# Patient Record
Sex: FEMALE | Race: WHITE | NOT HISPANIC OR LATINO | URBAN - METROPOLITAN AREA
[De-identification: names, ages, dates, MRNs, and addresses within clinical notes are randomized per-mention and may not be internally consistent; named-entity substitution may affect disease eponyms.]

---

## 2018-05-14 ENCOUNTER — INPATIENT (INPATIENT)
Facility: HOSPITAL | Age: 78
LOS: 2 days | Discharge: NURSING, REHAB OR LONG-TERM CARE | DRG: 149 | End: 2018-05-17
Attending: INTERNAL MEDICINE | Admitting: INTERNAL MEDICINE
Payer: MEDICARE

## 2018-05-14 VITALS
OXYGEN SATURATION: 97 % | RESPIRATION RATE: 16 BRPM | HEART RATE: 70 BPM | DIASTOLIC BLOOD PRESSURE: 112 MMHG | TEMPERATURE: 99 F | SYSTOLIC BLOOD PRESSURE: 165 MMHG

## 2018-05-14 DIAGNOSIS — I63.9 CEREBRAL INFARCTION, UNSPECIFIED: ICD-10-CM

## 2018-05-14 DIAGNOSIS — E78.5 HYPERLIPIDEMIA, UNSPECIFIED: ICD-10-CM

## 2018-05-14 DIAGNOSIS — R63.8 OTHER SYMPTOMS AND SIGNS CONCERNING FOOD AND FLUID INTAKE: ICD-10-CM

## 2018-05-14 DIAGNOSIS — I10 ESSENTIAL (PRIMARY) HYPERTENSION: ICD-10-CM

## 2018-05-14 DIAGNOSIS — Z29.9 ENCOUNTER FOR PROPHYLACTIC MEASURES, UNSPECIFIED: ICD-10-CM

## 2018-05-14 DIAGNOSIS — I48.91 UNSPECIFIED ATRIAL FIBRILLATION: ICD-10-CM

## 2018-05-14 LAB
ALBUMIN SERPL ELPH-MCNC: 4.1 G/DL — SIGNIFICANT CHANGE UP (ref 3.3–5)
ALP SERPL-CCNC: 38 U/L — LOW (ref 40–120)
ALT FLD-CCNC: 21 U/L — SIGNIFICANT CHANGE UP (ref 10–45)
ANION GAP SERPL CALC-SCNC: 11 MMOL/L — SIGNIFICANT CHANGE UP (ref 5–17)
APTT BLD: 34.1 SEC — SIGNIFICANT CHANGE UP (ref 27.5–37.4)
AST SERPL-CCNC: 42 U/L — HIGH (ref 10–40)
BASOPHILS NFR BLD AUTO: 0.5 % — SIGNIFICANT CHANGE UP (ref 0–2)
BILIRUB SERPL-MCNC: 0.7 MG/DL — SIGNIFICANT CHANGE UP (ref 0.2–1.2)
BUN SERPL-MCNC: 16 MG/DL — SIGNIFICANT CHANGE UP (ref 7–23)
CALCIUM SERPL-MCNC: 9.4 MG/DL — SIGNIFICANT CHANGE UP (ref 8.4–10.5)
CHLORIDE SERPL-SCNC: 99 MMOL/L — SIGNIFICANT CHANGE UP (ref 96–108)
CO2 SERPL-SCNC: 25 MMOL/L — SIGNIFICANT CHANGE UP (ref 22–31)
CREAT SERPL-MCNC: 0.9 MG/DL — SIGNIFICANT CHANGE UP (ref 0.5–1.3)
EOSINOPHIL NFR BLD AUTO: 1.8 % — SIGNIFICANT CHANGE UP (ref 0–6)
GLUCOSE SERPL-MCNC: 93 MG/DL — SIGNIFICANT CHANGE UP (ref 70–99)
HCT VFR BLD CALC: 41.1 % — SIGNIFICANT CHANGE UP (ref 34.5–45)
HGB BLD-MCNC: 14 G/DL — SIGNIFICANT CHANGE UP (ref 11.5–15.5)
INR BLD: 1.26 — HIGH (ref 0.88–1.16)
LYMPHOCYTES # BLD AUTO: 34.7 % — SIGNIFICANT CHANGE UP (ref 13–44)
MCHC RBC-ENTMCNC: 30.4 PG — SIGNIFICANT CHANGE UP (ref 27–34)
MCHC RBC-ENTMCNC: 34.1 G/DL — SIGNIFICANT CHANGE UP (ref 32–36)
MCV RBC AUTO: 89.2 FL — SIGNIFICANT CHANGE UP (ref 80–100)
MONOCYTES NFR BLD AUTO: 10.9 % — SIGNIFICANT CHANGE UP (ref 2–14)
NEUTROPHILS NFR BLD AUTO: 52.1 % — SIGNIFICANT CHANGE UP (ref 43–77)
PLATELET # BLD AUTO: 195 K/UL — SIGNIFICANT CHANGE UP (ref 150–400)
POTASSIUM SERPL-MCNC: SIGNIFICANT CHANGE UP MMOL/L (ref 3.5–5.3)
POTASSIUM SERPL-SCNC: SIGNIFICANT CHANGE UP MMOL/L (ref 3.5–5.3)
PROT SERPL-MCNC: 7.3 G/DL — SIGNIFICANT CHANGE UP (ref 6–8.3)
PROTHROM AB SERPL-ACNC: 14 SEC — HIGH (ref 9.8–12.7)
RBC # BLD: 4.61 M/UL — SIGNIFICANT CHANGE UP (ref 3.8–5.2)
RBC # FLD: 14.4 % — SIGNIFICANT CHANGE UP (ref 10.3–16.9)
SODIUM SERPL-SCNC: 135 MMOL/L — SIGNIFICANT CHANGE UP (ref 135–145)
TROPONIN T SERPL-MCNC: <0.01 NG/ML — SIGNIFICANT CHANGE UP (ref 0–0.01)
WBC # BLD: 5.7 K/UL — SIGNIFICANT CHANGE UP (ref 3.8–10.5)
WBC # FLD AUTO: 5.7 K/UL — SIGNIFICANT CHANGE UP (ref 3.8–10.5)

## 2018-05-14 PROCEDURE — 93010 ELECTROCARDIOGRAM REPORT: CPT

## 2018-05-14 PROCEDURE — 70450 CT HEAD/BRAIN W/O DYE: CPT | Mod: 26,59

## 2018-05-14 PROCEDURE — 71045 X-RAY EXAM CHEST 1 VIEW: CPT | Mod: 26

## 2018-05-14 PROCEDURE — 99285 EMERGENCY DEPT VISIT HI MDM: CPT | Mod: 25

## 2018-05-14 PROCEDURE — 70496 CT ANGIOGRAPHY HEAD: CPT | Mod: 26

## 2018-05-14 PROCEDURE — 70498 CT ANGIOGRAPHY NECK: CPT | Mod: 26

## 2018-05-14 RX ORDER — DIGOXIN 250 MCG
1 TABLET ORAL
Qty: 0 | Refills: 0 | COMMUNITY

## 2018-05-14 RX ORDER — ATORVASTATIN CALCIUM 80 MG/1
1 TABLET, FILM COATED ORAL
Qty: 0 | Refills: 0 | COMMUNITY

## 2018-05-14 RX ORDER — ASPIRIN/CALCIUM CARB/MAGNESIUM 324 MG
81 TABLET ORAL DAILY
Qty: 0 | Refills: 0 | Status: DISCONTINUED | OUTPATIENT
Start: 2018-05-14 | End: 2018-05-17

## 2018-05-14 RX ORDER — SODIUM CHLORIDE 9 MG/ML
1000 INJECTION INTRAMUSCULAR; INTRAVENOUS; SUBCUTANEOUS
Qty: 0 | Refills: 0 | Status: DISCONTINUED | OUTPATIENT
Start: 2018-05-14 | End: 2018-05-15

## 2018-05-14 RX ORDER — ATORVASTATIN CALCIUM 80 MG/1
80 TABLET, FILM COATED ORAL AT BEDTIME
Qty: 0 | Refills: 0 | Status: DISCONTINUED | OUTPATIENT
Start: 2018-05-14 | End: 2018-05-17

## 2018-05-14 RX ORDER — APIXABAN 2.5 MG/1
1 TABLET, FILM COATED ORAL
Qty: 0 | Refills: 0 | COMMUNITY

## 2018-05-14 RX ORDER — GABAPENTIN 400 MG/1
1 CAPSULE ORAL
Qty: 0 | Refills: 0 | COMMUNITY

## 2018-05-14 RX ORDER — APIXABAN 2.5 MG/1
5 TABLET, FILM COATED ORAL EVERY 12 HOURS
Qty: 0 | Refills: 0 | Status: DISCONTINUED | OUTPATIENT
Start: 2018-05-14 | End: 2018-05-17

## 2018-05-14 RX ORDER — ASPIRIN/CALCIUM CARB/MAGNESIUM 324 MG
81 TABLET ORAL DAILY
Qty: 0 | Refills: 0 | Status: DISCONTINUED | OUTPATIENT
Start: 2018-05-14 | End: 2018-05-14

## 2018-05-14 RX ORDER — TRAMADOL HYDROCHLORIDE 50 MG/1
0 TABLET ORAL
Qty: 0 | Refills: 0 | COMMUNITY

## 2018-05-14 RX ORDER — LABETALOL HCL 100 MG
10 TABLET ORAL ONCE
Qty: 0 | Refills: 0 | Status: DISCONTINUED | OUTPATIENT
Start: 2018-05-14 | End: 2018-05-14

## 2018-05-14 RX ORDER — DIGOXIN 250 MCG
0.12 TABLET ORAL DAILY
Qty: 0 | Refills: 0 | Status: DISCONTINUED | OUTPATIENT
Start: 2018-05-14 | End: 2018-05-17

## 2018-05-14 RX ORDER — METOPROLOL TARTRATE 50 MG
1 TABLET ORAL
Qty: 0 | Refills: 0 | COMMUNITY

## 2018-05-14 RX ADMIN — SODIUM CHLORIDE 100 MILLILITER(S): 9 INJECTION INTRAMUSCULAR; INTRAVENOUS; SUBCUTANEOUS at 23:07

## 2018-05-14 RX ADMIN — ATORVASTATIN CALCIUM 80 MILLIGRAM(S): 80 TABLET, FILM COATED ORAL at 23:07

## 2018-05-14 NOTE — ED PROVIDER NOTE - PROGRESS NOTE DETAILS
stroke code called upon patient's arrival, pt sent in for r/o cerebellar stroke, spoke to dr. 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 Discussed with neuro attending. Will hold off on labetalol, allow generous bp to 220/120 without treatment. Will admit.

## 2018-05-14 NOTE — ED ADULT NURSE NOTE - OBJECTIVE STATEMENT
Pt sent in by PCP for stroke eval. Pt reports "I was swaying from side to side when I was walking and it felt like pressure on my shoulders pushing down. All day i've felt weak." Pt reports occipital HA, currently HTN. will monitor and maintain safety.

## 2018-05-14 NOTE — ED ADULT TRIAGE NOTE - CHIEF COMPLAINT QUOTE
dizziness started this morning - went to MD -- sent here for R/O CVA;; per notes client has HA and unsteady gait

## 2018-05-14 NOTE — ED PROVIDER NOTE - MEDICAL DECISION MAKING DETAILS
77F with concern for dizziness, hx of stroke, sent in by PMD to r/o cerebellar stroke, sx onset in AM, discussed with neuro team after code stroke called. Will hold off on tpa at this time given pt is out of window (sx started in morning), will obtain CT/CTA of head/neck.

## 2018-05-14 NOTE — ED ADULT TRIAGE NOTE - LANGUAGE ASSISTANCE NEEDED
Yes-Patient/Caregiver accepts free interpretation services.../upgraded client because of MD notes - proceding with assessment and getting

## 2018-05-14 NOTE — H&P ADULT - HISTORY OF PRESENT ILLNESS
77 yr old Rwandan speaking female with a PMHx of CVA (unknown deficits), A-fib (on Dig and eliquis) and HTN/HLD presenting from PMD's office with acute onset dizziness and difficulty with ambulating since waking up this morning. Patient's last known normal was yesterday before going to sleep and woke up this morning with presenting symptoms. Patient also endorses crown of head pressure and heaviness but denies any headache, blurry vision, nausea, vomiting, neck pain, chest pain.  NIHSS 1     ED VS:   Vital Signs Last 24 Hrs  T(C): 37 (14 May 2018 18:11), Max: 37 (14 May 2018 18:11)  T(F): 98.6 (14 May 2018 18:11), Max: 98.6 (14 May 2018 18:11)  HR: 87 (14 May 2018 18:48) (63 - 87)  BP: 217/128 (14 May 2018 18:48) (165/112 - 217/128)  BP(mean): --  RR: 18 (14 May 2018 18:48) (16 - 18)  SpO2: 99% (14 May 2018 18:48) (97% - 99%)

## 2018-05-14 NOTE — ED ADULT NURSE NOTE - PMH
Afib    CVA (cerebral vascular accident)    HTN (hypertension) Afib    CVA (cerebral vascular accident)    HLD (hyperlipidemia)    HTN (hypertension)

## 2018-05-14 NOTE — H&P ADULT - NSHPLABSRESULTS_GEN_ALL_CORE
LABS:                          14.0   5.7   )-----------( 195      ( 14 May 2018 18:41 )             41.1     05-14    135  |  99  |  16  ----------------------------<  93  see note   |  25  |  0.90    Ca    9.4      14 May 2018 18:41    TPro  7.3  /  Alb  4.1  /  TBili  0.7  /  DBili  x   /  AST  42<H>  /  ALT  21  /  AlkPhos  38<L>  05-14    PT/INR - ( 14 May 2018 18:41 )   PT: 14.0 sec;   INR: 1.26          PTT - ( 14 May 2018 18:41 )  PTT:34.1 sec      RADIOLOGY & ADDITIONAL STUDIES:      CT Brain Stroke Protocol   IMPRESSION: No acute transcortical infarct or intracranial hemorrhage.   Old infarction in the left frontal lobe. Chronic small vessel ischemic   changes, severe in extent.

## 2018-05-14 NOTE — ED PROVIDER NOTE - OBJECTIVE STATEMENT
77F with hx of HTN, previous stroke, on eliquis, Dig (afib hx) presenting with dizziness unsteady gait from PMD's office, was sent in concern for stroke, pt upgraded to me due to concern for stroke. Sx began in morning. Unclear last seen normal, Albanian speaking female.

## 2018-05-14 NOTE — H&P ADULT - ASSESSMENT
77 yr old female with a PMHx of CVA (unclear deficits), a-fib (on eliquis, dig, toprol), HTN and HLD presenting with new onset RUE weakness, dizziness, difficulty ambulating since waking up this morning admitted to  for stroke r/o

## 2018-05-14 NOTE — H&P ADULT - PROBLEM SELECTOR PLAN 1
-patient new onset RUE weakness and dizziness; last known normal was last night before bed  -CTH (-) for acute stroke but s/f chronic L frontal lobe wedge shape infarct  -CTA pending  -MR Head   -MIRNA in AM  -PT/OT consult  -dysphagia screen  -ASA 81  -TSH, A1c, FLP to optimize stroke risk factors

## 2018-05-14 NOTE — ED PROVIDER NOTE - PHYSICAL EXAMINATION
General: NAD, alert, conversant, comfortable-appearing  Head: ncat  Eyes: clear, pupils round reactive to light no nystagmus  Thoat: MMM, oropharynx clear  Neck: supple,   CV: RRR no murmurs  Resp: CTAB no w/c/r  Abd: nontender, no rebound/guarding  Ext: no lower ext swelling, 2+ dp/pt pulses  Neuro: alert, cn grossly intact, no nystagmus, pupils equal round reactive, strength equal in all 4 ext, fnf mild dysmetria bl

## 2018-05-15 LAB
ANION GAP SERPL CALC-SCNC: 10 MMOL/L — SIGNIFICANT CHANGE UP (ref 5–17)
BLD GP AB SCN SERPL QL: NEGATIVE — SIGNIFICANT CHANGE UP
BUN SERPL-MCNC: 13 MG/DL — SIGNIFICANT CHANGE UP (ref 7–23)
CALCIUM SERPL-MCNC: 9.4 MG/DL — SIGNIFICANT CHANGE UP (ref 8.4–10.5)
CHLORIDE SERPL-SCNC: 103 MMOL/L — SIGNIFICANT CHANGE UP (ref 96–108)
CO2 SERPL-SCNC: 27 MMOL/L — SIGNIFICANT CHANGE UP (ref 22–31)
CREAT SERPL-MCNC: 0.96 MG/DL — SIGNIFICANT CHANGE UP (ref 0.5–1.3)
GLUCOSE SERPL-MCNC: 90 MG/DL — SIGNIFICANT CHANGE UP (ref 70–99)
HCT VFR BLD CALC: 40 % — SIGNIFICANT CHANGE UP (ref 34.5–45)
HGB BLD-MCNC: 13.6 G/DL — SIGNIFICANT CHANGE UP (ref 11.5–15.5)
MAGNESIUM SERPL-MCNC: 2 MG/DL — SIGNIFICANT CHANGE UP (ref 1.6–2.6)
MCHC RBC-ENTMCNC: 30.3 PG — SIGNIFICANT CHANGE UP (ref 27–34)
MCHC RBC-ENTMCNC: 34 G/DL — SIGNIFICANT CHANGE UP (ref 32–36)
MCV RBC AUTO: 89.1 FL — SIGNIFICANT CHANGE UP (ref 80–100)
PLATELET # BLD AUTO: 191 K/UL — SIGNIFICANT CHANGE UP (ref 150–400)
POTASSIUM SERPL-MCNC: 4.1 MMOL/L — SIGNIFICANT CHANGE UP (ref 3.5–5.3)
POTASSIUM SERPL-SCNC: 4.1 MMOL/L — SIGNIFICANT CHANGE UP (ref 3.5–5.3)
RBC # BLD: 4.49 M/UL — SIGNIFICANT CHANGE UP (ref 3.8–5.2)
RBC # FLD: 14.3 % — SIGNIFICANT CHANGE UP (ref 10.3–16.9)
RH IG SCN BLD-IMP: POSITIVE — SIGNIFICANT CHANGE UP
SODIUM SERPL-SCNC: 140 MMOL/L — SIGNIFICANT CHANGE UP (ref 135–145)
T4 FREE SERPL-MCNC: 1 NG/DL — SIGNIFICANT CHANGE UP (ref 0.7–1.48)
WBC # BLD: 6.1 K/UL — SIGNIFICANT CHANGE UP (ref 3.8–10.5)
WBC # FLD AUTO: 6.1 K/UL — SIGNIFICANT CHANGE UP (ref 3.8–10.5)

## 2018-05-15 PROCEDURE — 70551 MRI BRAIN STEM W/O DYE: CPT | Mod: 26

## 2018-05-15 PROCEDURE — 93306 TTE W/DOPPLER COMPLETE: CPT | Mod: 26

## 2018-05-15 RX ADMIN — Medication 81 MILLIGRAM(S): at 17:37

## 2018-05-15 RX ADMIN — APIXABAN 5 MILLIGRAM(S): 2.5 TABLET, FILM COATED ORAL at 06:04

## 2018-05-15 RX ADMIN — APIXABAN 5 MILLIGRAM(S): 2.5 TABLET, FILM COATED ORAL at 17:36

## 2018-05-15 RX ADMIN — Medication 0.12 MILLIGRAM(S): at 06:04

## 2018-05-15 RX ADMIN — ATORVASTATIN CALCIUM 80 MILLIGRAM(S): 80 TABLET, FILM COATED ORAL at 21:08

## 2018-05-15 NOTE — OCCUPATIONAL THERAPY INITIAL EVALUATION ADULT - MD ORDER
77 yr old Mauritian speaking female with a PMHx of CVA (unknown deficits), A-fib (on Dig and eliquis) and HTN/HLD presenting from PMD's office with acute onset dizziness and difficulty with ambulating since waking up this morning. Patient's last known normal was yesterday before going to sleep and woke up this morning with presenting symptoms. Patient also endorses crown of head pressure and heaviness but denies any headache, blurry vision, nausea, vomiting, neck pain, chest pain. NIHSS 1

## 2018-05-15 NOTE — OCCUPATIONAL THERAPY INITIAL EVALUATION ADULT - ADDITIONAL COMMENTS
Reports independence with functional mobility and basic and instrumental Activities of Daily Living PTA without AD. Patient reports right upper extremity weakness from old CVA.     Patient Singaporean Speaking, evaluation with PT Carola present to assist with translation.

## 2018-05-15 NOTE — PROGRESS NOTE ADULT - PROBLEM SELECTOR PLAN 6
SCDs    F: NS at 100cc/hr  E: Replete PRN  N: DASH  Lines: peripheral  Code; full  Dispo: 7lachman while undergoing stroke workup

## 2018-05-15 NOTE — OCCUPATIONAL THERAPY INITIAL EVALUATION ADULT - STANDING BALANCE: DYNAMIC, REHAB EVAL
ambulated 4 bedside steps with [place]/CGA and right Hand Held Assist; limited assessment in context of hypertension - RN Rachel aware/poor plus

## 2018-05-15 NOTE — PHYSICAL THERAPY INITIAL EVALUATION ADULT - PREDICTED DURATION OF THERAPY (DAYS/WKS), PT EVAL
Pt. would benefit from further PT follow up to further assess and improve gait/balance - to facilitate return to prior level of function.

## 2018-05-15 NOTE — OCCUPATIONAL THERAPY INITIAL EVALUATION ADULT - VISUAL ASSESSMENT: EYE MUSCLE BALANCE
multidirectional nystagmus: + left horizontal beating nystagmus when tracking to left and + right horizontal beating nystagmus when tracking to right

## 2018-05-15 NOTE — OCCUPATIONAL THERAPY INITIAL EVALUATION ADULT - LEVEL OF INDEPENDENCE: SIT/STAND, REHAB EVAL
right Hand Held Assist; X1rep, limited in context of hypertension, RN Rachel aware/stand-by assist/contact guard

## 2018-05-15 NOTE — CONSULT NOTE ADULT - ASSESSMENT
77 yr old female with a PMHx of CVA (unclear deficits), a-fib (on eliquis, dig, toprol), HTN and HLD presenting with new onset RUE weakness, dizziness, difficulty ambulating since waking up this morning admitted to  for stroke r/o      Problem/Plan - 1:  ·  Problem: Cerebrovascular accident (CVA), unspecified mechanism.  Plan: -patient new onset RUE weakness and dizziness; last known normal was last night before bed  -CTH (-) for acute stroke but s/f chronic L frontal lobe wedge shape infarct  -CTA pending  -MR Head   -MIRNA

## 2018-05-15 NOTE — CONSULT NOTE ADULT - SUBJECTIVE AND OBJECTIVE BOX
Patient is a 77y old  Female who presents with a chief complaint of r/o stroke (14 May 2018 19:24)       HPI:  77 yr old Norwegian speaking female with a PMHx of CVA (unknown deficits), A-fib (on Dig and eliquis) and HTN/HLD presenting from PMD's office with acute onset dizziness and difficulty with ambulating since waking up this morning. Patient's last known normal was yesterday before going to sleep and woke up this morning with presenting symptoms. Patient also endorses crown of head pressure and heaviness but denies any headache, blurry vision, nausea, vomiting, neck pain, chest pain.  NIHSS 1     ED VS:   Vital Signs Last 24 Hrs  T(C): 37 (14 May 2018 18:11), Max: 37 (14 May 2018 18:11)  T(F): 98.6 (14 May 2018 18:11), Max: 98.6 (14 May 2018 18:11)  HR: 87 (14 May 2018 18:48) (63 - 87)  BP: 217/128 (14 May 2018 18:48) (165/112 - 217/128)  BP(mean): --  RR: 18 (14 May 2018 18:48) (16 - 18)  SpO2: 99% (14 May 2018 18:48) (97% - 99%) (14 May 2018 19:24)      PAST MEDICAL & SURGICAL HISTORY:  HLD (hyperlipidemia)  Afib  CVA (cerebral vascular accident)  HTN (hypertension)      MEDICATIONS  (STANDING):  apixaban 5 milliGRAM(s) Oral every 12 hours  aspirin  chewable 81 milliGRAM(s) Oral daily  atorvastatin 80 milliGRAM(s) Oral at bedtime  digoxin     Tablet 0.125 milliGRAM(s) Oral daily  sodium chloride 0.9%. 1000 milliLiter(s) (100 mL/Hr) IV Continuous <Continuous>    MEDICATIONS  (PRN):      Social History: lives with her friend in an elevator accessible apartment building, no home care services    Functional Level Prior to Admission: ADL independent, walks without assistive devices    FAMILY HISTORY:      CBC Full  -  ( 15 May 2018 02:51 )  WBC Count : 6.1 K/uL  Hemoglobin : 13.6 g/dL  Hematocrit : 40.0 %  Platelet Count - Automated : 191 K/uL  Mean Cell Volume : 89.1 fL  Mean Cell Hemoglobin : 30.3 pg  Mean Cell Hemoglobin Concentration : 34.0 g/dL  Auto Neutrophil # : x  Auto Lymphocyte # : x  Auto Monocyte # : x  Auto Eosinophil # : x  Auto Basophil # : x  Auto Neutrophil % : x  Auto Lymphocyte % : x  Auto Monocyte % : x  Auto Eosinophil % : x  Auto Basophil % : x      05-15    140  |  103  |  13  ----------------------------<  90  4.1   |  27  |  0.96    Ca    9.4      15 May 2018 02:51  Mg     2.0     05-15    TPro  7.3  /  Alb  4.1  /  TBili  0.7  /  DBili  x   /  AST  42<H>  /  ALT  21  /  AlkPhos  38<L>  05-14            Radiology:    < from: CT Brain Stroke Protocol (05.14.18 @ 18:34) >  EXAM:  CT BRAIN STROKE PROTOCOL                          PROCEDURE DATE:  05/14/2018                     INTERPRETATION:  INorbert MD, have reviewed the images and the   report and agree with the findings, with the following modification:    There also are encephalomalacia changes within the paramedian right   occipital lobe compatible with old infarct along the right PCA territory.    RESIDENT PRELIMINARY REPORT:     PROCEDURE: CT head without intravenous contrast    INDICATION: Dizziness.    TECHNIQUE: Multiple axial images were obtained at 5 mm intervals from the   skull base to the vertex. The images were reviewed in brain and bone   windows.    COMPARISON: None.    FINDINGS: The CT examination demonstrates a wedge-shaped hypodensity in   the left frontal lobe with associated volume loss and encephalomalacia,   consistent with an old infarction. There is no acute transcortical   infarct. There is no intracranial hemorrhage. There is age-appropriate   volume loss as manifested by the enlargement of the ventricles, cisternal   spaces, and cortical sulci throughout. There is no midline shift or extra   axial collections. There are confluent areas of hypodensity within the   periventricular white matter which likely represents the sequela of small   vessel ischemic disease, severe in extent. The bony windows demonstrates   no fractures. The visualized paranasal sinuses are within normal limits.   The mastoid air cells are well aerated.    IMPRESSION: No acute transcortical infarct or intracranial hemorrhage.   Old infarction in the left frontal lobe. Chronic small vessel ischemic   changes, severe in extent.         < from: CT Angio Head w/ IV Cont (05.14.18 @ 19:04) >  EXAM:  CT ANGIO BRAIN (W)AW IC                          EXAM:  CT ANGIO NECK (W)AW IC                          PROCEDURE DATE:  05/14/2018     90  Optiray 350   0           INTERPRETATION:  PROCEDURE: CTA brain with and without intravenous   contrast.    INDICATION: Dizziness    TECHNIQUE: Multiple thin section axial images were obtained through the   [place] following the intravenous injection of contrast. MPR   sagittal and coronal MIP images were generated from the axial images.    COMPARISON: None    FINDINGS: The CTA examination of the [place] demonstrates the   internal carotid arteries to be normal in caliber. There is a normal   bifurcation into the A1 and M1 segments. The right A1 segment is   underdeveloped. The anterior communicating artery is patent. There is a   normal MCA bifurcation. There is tortuosity of the vertebrobasilar   system. The right vertebral artery is underdeveloped and ends in the   posterior inferior cerebellar artery (PICA). The left vertebral artery is   normal in caliber. The basilar artery is normal in caliber. There is a   normal bifurcation into the posterior cerebral arteries. There are no   areas of stenosis, dilatation or aneurysm.    IMPRESSION: No large vessel occlusion.      PROCEDURE: CTA neck with and without intravenous contrast.    INDICATION: Dizziness    TECHNIQUE: Multiple axial thin section were obtained through the neck   following the intravenous injection of contrast. MPR sagittal and coronal   MIP images were generated from the axial images.     COMPARISON: None    FINDINGS: The CTA examination demonstrates a retropharyngeal course to   the right common carotid artery. The right common carotid artery to be   normal in caliber. There is a normal bifurcation into the right internal   and external carotid arteries. There is no hemodynamically significant   stenosis.     There also is a retropharyngeal course to the left common carotid artery.   The left common carotid artery is otherwise normal in caliber. There is a   normal bifurcation into the left internal and external carotid arteries.   There is calcification at the left carotid bifurcation and proximal left   internal carotid artery. There is no hemodynamically significant   stenosis.     The right vertebral artery is underdeveloped. There is an ectatic course   to the proximal vertebral arteries bilaterally. The vertebral arteries   are otherwise normal in caliber.    The aortic arch appears intact without narrowing of the origin of the  great vessels.    Limited evaluation of the neck demonstrates a a paucity of right-sided   thyroid glandular tissue with the left lobe being dominant.    IMPRESSION: No carotid stenosis.            Vital Signs Last 24 Hrs  T(C): 36.9 (15 May 2018 13:53), Max: 37.1 (15 May 2018 06:00)  T(F): 98.4 (15 May 2018 13:53), Max: 98.7 (15 May 2018 06:00)  HR: 100 (15 May 2018 12:54) (63 - 100)  BP: 139/91 (15 May 2018 12:54) (139/91 - 217/128)  BP(mean): 110 (15 May 2018 12:54) (110 - 135)  RR: 18 (15 May 2018 08:17) (16 - 18)  SpO2: 95% (15 May 2018 12:54) (95% - 99%)    REVIEW OF SYSTEMS:    CONSTITUTIONAL: No fever, weight loss, or fatigue  EYES: No eye pain, visual disturbances, or discharge  ENMT:  No difficulty hearing, tinnitus, vertigo; No sinus or throat pain  NECK: No pain or stiffness  BREASTS: No pain, masses, or nipple discharge  RESPIRATORY: No cough, wheezing, chills or hemoptysis; No shortness of breath  CARDIOVASCULAR: No chest pain, palpitations, dizziness, or leg swelling  GASTROINTESTINAL: No abdominal or epigastric pain. No nausea, vomiting, or hematemesis; No diarrhea or constipation. No melena or hematochezia.  GENITOURINARY: No dysuria, frequency, hematuria, or incontinence  NEUROLOGICAL: dizziness  SKIN: No itching, burning, rashes, or lesions   LYMPH NODES: No enlarged glands  ENDOCRINE: No heat or cold intolerance; No hair loss  MUSCULOSKELETAL: No joint pain or swelling; No muscle, back, or extremity pain  PSYCHIATRIC: No depression, anxiety, mood swings, or difficulty sleeping  HEME/LYMPH: No easy bruising, or bleeding gums  ALLERGY AND IMMUNOLOGIC: No hives or eczema  VASCULAR: no swelling, erythema    Physical Exam: WDWN  woman lying comfortably in semi Banks's position, c/o dizziness    Head: normocephalic, atraumatic    Eyes: PERRLA, EOMI, no nystagmus, sclera anicteric    ENT: nasal discharge, uvula midline, no oropharyngeal erythema/exudate    Neck: supple, negative JVD, negative carotid bruits, no thyromegaly    Chest: CTA bilaterally, neg wheeze, rhonchi, rales, crackles, egophany    Cardiovascular: regular rate and rhythm, neg murmurs/rubs/gallops    Abdomen: soft, non distended, non tender, negative rebound/guarding, normal bowel sounds, neg hepatosplenomegaly    Extremities: WWP, neg cyanosis/clubbing/edema, negative calf tenderness to palpation, negative Pierce's sign    :     Neurologic Exam:    Alert and oriented to person, place, date/year, speech fluent w/o dysarthria, repetition intact, comprehension intact,     Cranial Nerves:     II:                       pupils equal, round and reactive to light, visual fields intact   III/ IV/VI:             extraocular movements intact, horizontal nystagmus with left and right gaze  V:                       facial sensation intact, V1-3 normal  VII:                     face symmetric, no droop, normal eye closure and smile  VIII:                    hearing intact to finger rub bilaterally  IX/ X:                 soft palate rise symmetrical  XI:                      head turning, shoulder shrug normal  XII:                     tongue midline    Motor Exam:    Upper Extremities:        Right:    4/5 /intrinsics                                                 4/5 deltoid                                                 4-/5 biceps                                                 5/5 triceps                                                 5/5 wrist extensors-flexors                                                 negative pronator drift                                        Left:      5/5 /intrinsics                                                 5/5 deltoid                                                 5/5 biceps                                                 5/5 triceps                                                 5/5 wrist extensors/flexors                                                 negative pronator drift      Lower Extremities:         Right      5/5 hip flexors/adductors/abductors                                                   5/5 quadriceps/hamstrings                                                   5/5 dorsiflexors/plantar flexors/invertors-evertors                                       Left:       5/5 hip flexors/adductors/abductors                                                  5/5 quadriceps/hamstrings                                                  5/5 dorsiflexors/plantar flexors/invertors-evertors    Sensory:              intact to LT/PP in all UE/LE dermatomes    DTR:                   = biceps/     triceps/     brachioradialis                            = patella/   medial hamstring/    ankle                            neg clonus                            neg Babinski                            neg Hoffmans    Finger to Nose:  wnl    Heel to Shin:       wnl    Rapid Alternating movements:   wnl    Joint Position Sense:  intact    Romberg:  not tested    Tandem Walking:  not tested    Gait:  not tested        PM&R Impression:    1) r/o CVA  2) Right UE weakness 4/5 range ( patient reports this is chronic from previous CVA)      Recommendations:    1) Physical therapy focusing on therapeutic exercises, bed mobility/transfer out of bed evaluation, progressive ambulation with assistive devices.    2) Anticipated Disposition Plan/Recommendations:  pending functional progress/ MRI brain results

## 2018-05-15 NOTE — PHYSICAL THERAPY INITIAL EVALUATION ADULT - MODALITIES TREATMENT COMMENTS
Facial symmetry intact, speech fluent, + nystagmus 3-4 beats in both directions; denies dizziness at this time.

## 2018-05-15 NOTE — OCCUPATIONAL THERAPY INITIAL EVALUATION ADULT - MANUAL MUSCLE TESTING RESULTS, REHAB EVAL
right upper extremity: shoulder flexion/extension 4+/5, elbow flexion 3+/5, elbow extension 4-/5, wrist extension 4-/5, hand  4+/5; left upper extremity 5/5 throughout

## 2018-05-15 NOTE — OCCUPATIONAL THERAPY INITIAL EVALUATION ADULT - COORDINATION ASSESSED, REHAB EVAL
finger to nose/left upper extremity grossly intact, right upper extremity decreased speed no dysmetria noted

## 2018-05-15 NOTE — OCCUPATIONAL THERAPY INITIAL EVALUATION ADULT - PLANNED THERAPY INTERVENTIONS, OT EVAL
bed mobility training/IADL retraining/balance training/ADL retraining/strengthening/transfer training

## 2018-05-15 NOTE — OCCUPATIONAL THERAPY INITIAL EVALUATION ADULT - PERTINENT HX OF CURRENT PROBLEM, REHAB EVAL
77 yr old Ukrainian speaking female with a PMHx of CVA (unknown deficits), A-fib (on Dig and eliquis) and HTN/HLD presenting from PMD's office with acute onset dizziness and difficulty with ambulating since waking up this morning. Patient's last known normal was yesterday before going to sleep and woke up this morning with presenting symptoms. Patient also endorses crown of head pressure and heaviness but denies any headache, blurry vision, nausea, vomiting, neck pain, chest pain. NIHSS 1.

## 2018-05-15 NOTE — PROGRESS NOTE ADULT - SUBJECTIVE AND OBJECTIVE BOX
OVERNIGHT EVENTS:   SUBJECTIVE / INTERVAL HPI: Patient seen and examined at bedside.     VITAL SIGNS:  Vital Signs Last 24 Hrs  T(C): 37.1 (15 May 2018 17:20), Max: 37.1 (15 May 2018 06:00)  T(F): 98.8 (15 May 2018 17:20), Max: 98.8 (15 May 2018 17:20)  HR: 106 (15 May 2018 17:40) (68 - 106)  BP: 118/65 (15 May 2018 17:40) (118/65 - 178/112)  BP(mean): 87 (15 May 2018 17:40) (87 - 135)  RR: 18 (15 May 2018 17:40) (17 - 18)  SpO2: 92% (15 May 2018 17:40) (92% - 98%)    PHYSICAL EXAM:  General: WDWN  HEENT: NC/AT; PERRL, anicteric sclera; MMM  Neck: supple  Cardiovascular: +S1/S2; RRR  Respiratory: CTA B/L; no W/R/R  Gastrointestinal: soft, NT/ND; +BSx4  Extremities: WWP; no edema, clubbing or cyanosis  Vascular: 2+ radial, DP/PT pulses B/L  Neurological: AAOx3; no focal deficits    MEDICATIONS:  MEDICATIONS  (STANDING):  apixaban 5 milliGRAM(s) Oral every 12 hours  aspirin  chewable 81 milliGRAM(s) Oral daily  atorvastatin 80 milliGRAM(s) Oral at bedtime  digoxin     Tablet 0.125 milliGRAM(s) Oral daily  sodium chloride 0.9%. 1000 milliLiter(s) (100 mL/Hr) IV Continuous <Continuous>    MEDICATIONS  (PRN):      ALLERGIES:  Allergies    No Known Allergies    Intolerances        LABS:                        13.6   6.1   )-----------( 191      ( 15 May 2018 02:51 )             40.0     05-15    140  |  103  |  13  ----------------------------<  90  4.1   |  27  |  0.96    Ca    9.4      15 May 2018 02:51  Mg     2.0     05-15    TPro  7.3  /  Alb  4.1  /  TBili  0.7  /  DBili  x   /  AST  42<H>  /  ALT  21  /  AlkPhos  38<L>  05-14    PT/INR - ( 14 May 2018 18:41 )   PT: 14.0 sec;   INR: 1.26          PTT - ( 14 May 2018 18:41 )  PTT:34.1 sec    CAPILLARY BLOOD GLUCOSE      POCT Blood Glucose.: 78 mg/dL (14 May 2018 18:21)    CARDIAC MARKERS ( 14 May 2018 18:41 )  x     / <0.01 ng/mL / x     / x     / x            RADIOLOGY & ADDITIONAL TESTS: Reviewed. OVERNIGHT EVENTS: No overnight events.   SUBJECTIVE / INTERVAL HPI: Patient seen and examined at bedside. She is Bangladeshi speaking. Her daughter is at bedside. She is denying dizziness or weakness, distressed because she is hungry (awaiting MIRNA).     VITAL SIGNS:  Vital Signs Last 24 Hrs  T(C): 37.1 (15 May 2018 17:20), Max: 37.1 (15 May 2018 06:00)  T(F): 98.8 (15 May 2018 17:20), Max: 98.8 (15 May 2018 17:20)  HR: 106 (15 May 2018 17:40) (68 - 106)  BP: 118/65 (15 May 2018 17:40) (118/65 - 178/112)  BP(mean): 87 (15 May 2018 17:40) (87 - 135)  RR: 18 (15 May 2018 17:40) (17 - 18)  SpO2: 92% (15 May 2018 17:40) (92% - 98%)    PHYSICAL EXAM:  General: WDWN, elderly frail female sitting up in bed in no acute distress breathing room air.   HEENT: NC/AT; PERRL, anicteric sclera; MMM  Neck: supple  Cardiovascular: +S1/S2; RRR  Respiratory: CTA B/L; no W/R/R  Gastrointestinal: soft, NT/ND; +BSx4  Extremities: WWP; no edema, clubbing or cyanosis  Vascular: 2+ radial, DP/PT pulses B/L  Neurological: AAOx3; no focal deficits. CN2-12 intact, Strength 5/5, sensation intact throughout. Cerebellar testing showed no defect.      MEDICATIONS:  MEDICATIONS  (STANDING):  apixaban 5 milliGRAM(s) Oral every 12 hours  aspirin  chewable 81 milliGRAM(s) Oral daily  atorvastatin 80 milliGRAM(s) Oral at bedtime  digoxin     Tablet 0.125 milliGRAM(s) Oral daily  sodium chloride 0.9%. 1000 milliLiter(s) (100 mL/Hr) IV Continuous <Continuous>    MEDICATIONS  (PRN):      ALLERGIES:  Allergies    No Known Allergies    Intolerances        LABS:                        13.6   6.1   )-----------( 191      ( 15 May 2018 02:51 )             40.0     05-15    140  |  103  |  13  ----------------------------<  90  4.1   |  27  |  0.96    Ca    9.4      15 May 2018 02:51  Mg     2.0     05-15    TPro  7.3  /  Alb  4.1  /  TBili  0.7  /  DBili  x   /  AST  42<H>  /  ALT  21  /  AlkPhos  38<L>  05-14    PT/INR - ( 14 May 2018 18:41 )   PT: 14.0 sec;   INR: 1.26          PTT - ( 14 May 2018 18:41 )  PTT:34.1 sec    CAPILLARY BLOOD GLUCOSE      POCT Blood Glucose.: 78 mg/dL (14 May 2018 18:21)    CARDIAC MARKERS ( 14 May 2018 18:41 )  x     / <0.01 ng/mL / x     / x     / x            RADIOLOGY & ADDITIONAL TESTS: Reviewed.    < from: Echocardiogram (05.15.18 @ 15:11) >  Interpretation Summary  Patient was in atrial fibrillation during the study. There is mild   concentric   left ventricular hypertrophy.The left ventricular wall motion is   normal.The   left ventricular ejection fraction is normal.The left ventricular  ejection   fraction is 65%.The right ventricle is normal in size and function.The   left   atrium is mildly dilated. The left atrial volume index is 39 cc/m2   (normal   <34cc/m2) The left atrial appendage was not visualized.   Injection of   agitated saline contrast documented no interatrial shunt.Right atrial   size is   normal.No evidence for any hemodynamically significant valvular   disease.The   pulmonary artery systolic pressure is estimated to be 30 mmHg.There is no   pericardial effusion.Borderline dilated ascending aorta.The ascending   aorta   measures 3.8 cm (normal less than 3.8 cm for men, less than 3.5 cm for   women).    < end of copied text >

## 2018-05-15 NOTE — OCCUPATIONAL THERAPY INITIAL EVALUATION ADULT - GENERAL OBSERVATIONS, REHAB EVAL
Right hand dominant. Patient cleared for Occupational Therapy by Dr. Cid (aware to remove bedrest order) and PILO Ramos, patient received supine in semi-berger position in non-acute distress. +EKG, +IV heplock, +bed alarm. Patient denies pain, states she is frustrated to be NPO X last night, requesting to eat, discussed with PILO Ramso.

## 2018-05-15 NOTE — OCCUPATIONAL THERAPY INITIAL EVALUATION ADULT - GROSSLY INTACT, SENSORY
Reports chronic neuropathy with numbness fingertips and toes X 4 extremities; denies any new sensory deficit

## 2018-05-16 LAB
ANION GAP SERPL CALC-SCNC: 12 MMOL/L — SIGNIFICANT CHANGE UP (ref 5–17)
BUN SERPL-MCNC: 35 MG/DL — HIGH (ref 7–23)
CALCIUM SERPL-MCNC: 8.6 MG/DL — SIGNIFICANT CHANGE UP (ref 8.4–10.5)
CHLORIDE SERPL-SCNC: 102 MMOL/L — SIGNIFICANT CHANGE UP (ref 96–108)
CO2 SERPL-SCNC: 23 MMOL/L — SIGNIFICANT CHANGE UP (ref 22–31)
CREAT SERPL-MCNC: 1.16 MG/DL — SIGNIFICANT CHANGE UP (ref 0.5–1.3)
GLUCOSE SERPL-MCNC: 109 MG/DL — HIGH (ref 70–99)
HCT VFR BLD CALC: 38.8 % — SIGNIFICANT CHANGE UP (ref 34.5–45)
HGB BLD-MCNC: 13.1 G/DL — SIGNIFICANT CHANGE UP (ref 11.5–15.5)
MAGNESIUM SERPL-MCNC: 2 MG/DL — SIGNIFICANT CHANGE UP (ref 1.6–2.6)
MCHC RBC-ENTMCNC: 30.1 PG — SIGNIFICANT CHANGE UP (ref 27–34)
MCHC RBC-ENTMCNC: 33.8 G/DL — SIGNIFICANT CHANGE UP (ref 32–36)
MCV RBC AUTO: 89.2 FL — SIGNIFICANT CHANGE UP (ref 80–100)
PLATELET # BLD AUTO: 188 K/UL — SIGNIFICANT CHANGE UP (ref 150–400)
POTASSIUM SERPL-MCNC: 3.9 MMOL/L — SIGNIFICANT CHANGE UP (ref 3.5–5.3)
POTASSIUM SERPL-SCNC: 3.9 MMOL/L — SIGNIFICANT CHANGE UP (ref 3.5–5.3)
RBC # BLD: 4.35 M/UL — SIGNIFICANT CHANGE UP (ref 3.8–5.2)
RBC # FLD: 14.3 % — SIGNIFICANT CHANGE UP (ref 10.3–16.9)
SODIUM SERPL-SCNC: 137 MMOL/L — SIGNIFICANT CHANGE UP (ref 135–145)
WBC # BLD: 6.9 K/UL — SIGNIFICANT CHANGE UP (ref 3.8–10.5)
WBC # FLD AUTO: 6.9 K/UL — SIGNIFICANT CHANGE UP (ref 3.8–10.5)

## 2018-05-16 RX ADMIN — APIXABAN 5 MILLIGRAM(S): 2.5 TABLET, FILM COATED ORAL at 06:07

## 2018-05-16 RX ADMIN — APIXABAN 5 MILLIGRAM(S): 2.5 TABLET, FILM COATED ORAL at 17:33

## 2018-05-16 RX ADMIN — Medication 0.12 MILLIGRAM(S): at 06:07

## 2018-05-16 RX ADMIN — ATORVASTATIN CALCIUM 80 MILLIGRAM(S): 80 TABLET, FILM COATED ORAL at 21:15

## 2018-05-16 RX ADMIN — Medication 81 MILLIGRAM(S): at 12:05

## 2018-05-16 NOTE — PROGRESS NOTE ADULT - PROBLEM SELECTOR PLAN 6
SCDs    F: NS at 100cc/hr  E: Replete PRN  N: DASH  Lines: peripheral  Code; full  Dispo: 7lachman while undergoing stroke workup SCDs    F: NS at 100cc/hr  E: Replete PRN  N: DASH  Lines: peripheral  Code; full  Dispo: 7lachman while undergoing stroke workup -> LANCE

## 2018-05-16 NOTE — PROGRESS NOTE ADULT - SUBJECTIVE AND OBJECTIVE BOX
Physical Medicine and Rehabilitation Progress Note:    Patient is a 77y old  Female who presents with a chief complaint of r/o stroke (14 May 2018 19:24)      HPI:  77 yr old Croatian speaking female with a PMHx of CVA (unknown deficits), A-fib (on Dig and eliquis) and HTN/HLD presenting from PMD's office with acute onset dizziness and difficulty with ambulating since waking up this morning. Patient's last known normal was yesterday before going to sleep and woke up this morning with presenting symptoms. Patient also endorses crown of head pressure and heaviness but denies any headache, blurry vision, nausea, vomiting, neck pain, chest pain.  NIHSS 1     ED VS:   Vital Signs Last 24 Hrs  T(C): 37 (14 May 2018 18:11), Max: 37 (14 May 2018 18:11)  T(F): 98.6 (14 May 2018 18:11), Max: 98.6 (14 May 2018 18:11)  HR: 87 (14 May 2018 18:48) (63 - 87)  BP: 217/128 (14 May 2018 18:48) (165/112 - 217/128)  BP(mean): --  RR: 18 (14 May 2018 18:48) (16 - 18)  SpO2: 99% (14 May 2018 18:48) (97% - 99%) (14 May 2018 19:24)                            13.1   6.9   )-----------( 188      ( 16 May 2018 06:05 )             38.8       05-16    137  |  102  |  35<H>  ----------------------------<  109<H>  3.9   |  23  |  1.16    Ca    8.6      16 May 2018 06:07  Mg     2.0     05-16    TPro  7.3  /  Alb  4.1  /  TBili  0.7  /  DBili  x   /  AST  42<H>  /  ALT  21  /  AlkPhos  38<L>  05-14    Vital Signs Last 24 Hrs  T(C): 36.7 (16 May 2018 13:45), Max: 37.4 (16 May 2018 02:00)  T(F): 98 (16 May 2018 13:45), Max: 99.3 (16 May 2018 02:00)  HR: 96 (16 May 2018 09:40) (74 - 106)  BP: 163/97 (16 May 2018 09:40) (118/65 - 163/97)  BP(mean): 122 (16 May 2018 09:40) (87 - 122)  RR: 18 (16 May 2018 09:40) (17 - 18)  SpO2: 88% (16 May 2018 09:40) (88% - 95%)    MEDICATIONS  (STANDING):  apixaban 5 milliGRAM(s) Oral every 12 hours  aspirin  chewable 81 milliGRAM(s) Oral daily  atorvastatin 80 milliGRAM(s) Oral at bedtime  digoxin     Tablet 0.125 milliGRAM(s) Oral daily    MEDICATIONS  (PRN):    Currently Undergoing Physical Therapy at bedside.    Functional Status Assessment:    Previous Level of Function:     · Ambulation Skills	independent	  · Transfer Skills	independent	  · ADL Skills	independent	    Cognitive Status Examination:   · Orientation	oriented to person, place, time and situation	  · Level of Consciousness	alert	  · Follows Commands and Answers Questions	100% of the time	  · Personal Safety and Judgment	intact	    Range of Motion Exam:   · Range of Motion Examination	no ROM deficits were identified	    MMT Shoulder:     · Shoulder Flexion	(5) normal, left  4+ = good plus	  · Shoulder Extension	(5) normal, left  4+ = good plus	  · Shoulder ABduction	(5) normal, left  4+ = good plus	    MMT Elbow:     · Elbow Flexion	(5) normal, left  (3+) fair plus, right	  · Elbow Extension	(3) fair, right  (5) normal, left	    MMT Wrist:     · Wrist Flexion	(5) normal, left  (4-) good minus, right	  · Wrist Extension	(5) normal, left  (4-) good minus, right	    MMT Hip:     · Hip Flexion	(5) normal, left  (5) normal, right	    MMT Knee:     · Knee Extension	(5) normal, left  (5) normal, right	    MMT Ankle:     · Ankle Dorsiflexion	(5) normal, left  (5) normal, right	  · Ankle Plantarflexion	(5) normal, left  (5) normal, right	    Muscle Tone Assessment:   · Muscle Tone Assessment	normal	    Bed Mobility: Rolling/Turning:     · Level of Mathews	independent	    Bed Mobility: Scooting/Bridging:     · Level of Mathews	independent	    Bed Mobility: Sit to Supine:     · Level of Mathews	independent	    Bed Mobility: Supine to Sit:     · Level of Mathews	independent	    Transfer: Sit to Stand:     · Level of Mathews	contact guard; minimum assist (75% patients effort)	  · Physical Assist/Nonphysical Assist	1 person assist	  · Weight-Bearing Restrictions	full weight-bearing	  · Assistive Device	hand held assist	    Transfer: Stand to Sit:     · Level of Mathews	contact guard	  · Physical Assist/Nonphysical Assist	1 person assist	  · Weight-Bearing Restrictions	full weight-bearing	    Sit/Stand Transfer Safety Analysis:     · Impairments Contributing to Impaired Transfers	impaired balance	    Gait Skills:     · Level of Mathews	contact guard	  · Physical Assist/Nonphysical Assist	1 person assist	  · Weight-Bearing Restrictions	full weight-bearing	  · Assistive Device	hand held assist	  · Gait Distance	8 side steps	    Gait Analysis:     · Impairments Contributing to Gait Deviations	impaired balance	    Stair Negotiation:     · Level of Mathews	TBA	    Balance Skills Assessment:     · Sitting Balance: Static	good balance	  · Sitting Balance: Dynamic	good balance	  · Sit-to-Stand Balance	good minus	  · Standing Balance: Static	fair plus	  · Standing Balance: Dynamic	fair balance	    Sensory Examination:   Sensory Examination:    Light Touch Sensation:   · Left UE	within normal limits  except mild numbness at finger tips, chronic	  · Right UE	within normal limits  except mild numbness at finger tips, chronic	  · Left LE	within normal limits  except intermittent sole of the foot numbness - chronic	  · Right LE	within normal limits  except intermittent sole of the foot numbness - chronic	  · Head/Neck	within normal limits	      Proprioception:   · Left LE	within normal limits  ankle	  · Right LE	within normal limits  ankle	      Fine Motor Coordination:   Fine Motor Coordination Examination:    Fine Motor Coordination:   · Left Hand, Finger to Nose	normal performance	  · Right Hand, Finger to Nose	mild impairment  decreased speed	  · Left Hand Thumb/Finger Opposition Skills	normal performance	  · Right Hand Thumb/Finger Opposition Skills	normal performance	  · Left Hand, Manipulation of Objects	normal performance	  · Right Hand, Manipulation of Objects	mild impairment	  · Left Hand, Diadochokinesis Skills	normal performance	  · Right Hand, Diadochokinesis Skills	normal performance  mildly decreased speed	    Treatment Location:   · Comments	Facial symmetry intact, speech fluent, + nystagmus 3-4 beats in both directions; denies dizziness at this time.	    Clinical Impressions:   · Criteria for Skilled Therapeutic Interventions	impairments found; functional limitations in following categories	  · Impairments Found (describe specific impairments)	gait, locomotion, and balance	  · Functional Limitations in Following Categories (describe specific limitations)	self-care; home management	  · Risk Reduction/Prevention (Describe Specific Areas of risk reduction/prevention)	risk factors	  · Risk Areas	fall	  · Rehab Potential	good, to achieve stated therapy goals	  · Therapy Frequency	3-5x/week	  · Predicted Duration of Therapy Intervention (days/wks)	Pt. would benefit from further PT follow up to further assess and improve gait/balance - to facilitate return to prior level of function.	  · Anticipated Equipment Needs at Discharge	TBA	        PM&R Impression: as above, no acute stroke on MRI    Disposition Plan Recommendations:  pending functional progress, possible LANCE

## 2018-05-16 NOTE — PROGRESS NOTE ADULT - SUBJECTIVE AND OBJECTIVE BOX
O/N Events: Patient received Head MRI  Subjective/ROS: Denies HA, CP, SOB, n/v, changes in bowel/urinary habits.  12pt ROS otherwise negative.    VITALS  Vital Signs Last 24 Hrs  T(C): 37.4 (16 May 2018 02:00), Max: 37.4 (16 May 2018 02:00)  T(F): 99.3 (16 May 2018 02:00), Max: 99.3 (16 May 2018 02:00)  HR: 74 (16 May 2018 04:25) (70 - 106)  BP: 139/93 (16 May 2018 04:25) (118/65 - 178/112)  BP(mean): 109 (16 May 2018 04:25) (87 - 135)  RR: 18 (16 May 2018 04:25) (17 - 18)  SpO2: 94% (16 May 2018 04:25) (92% - 97%)    CAPILLARY BLOOD GLUCOSE    PHYSICAL EXAM  General: A&Ox3; NAD  Head: NC/AT; MMM; PERRL; EOMI;  Neck: Supple; no JVD  Respiratory: CTA B/L; no wheezes/crackles   Cardiovascular: Regular rhythm/rate; S1/S2   Gastrointestinal: Soft; NTND; normoactive BS  Extremities: WWP; no edema/cyanosis  Neurologic:  - Mental Status:  AAOx3; speech is fluent with intact naming, repetition, and comprehension  - Cranial Nerves II-XII:    II:  PERRLA; visual fields are full to confrontation  III, IV, VI:  EOMI, no nystagmus  V:  facial sensation is intact in the V1-V3 distribution bilaterally.  VII:  face is symmetric with normal eye closure and smile  VIII:  hearing is intact to finger rub  IX, X:  uvula is midline and soft palate rises symmetrically  XI:  head turning and shoulder shrug are intact bilaterally  XII:  tongue protrudes in the midline  - Motor:  strength is 5/5 throughout; normal muscle bulk and tone throughout; no pronator drift  - Reflexes:  2+ and symmetric at the biceps, triceps, brachioradialis, knees, and ankles;  plantar reflexes downgoing bilaterally  - Sensory:  intact to light touch, pin prick, vibration, and joint-position sense throughout  - Coordination:  finger-nose-finger and heel-knee-shin intact without dysmetria; rapid alternating hand movements intact  - Gait:  normal steps, base, arm swing, and turning; heel and toe walking are normal; tandem gait is normal; Romberg testing is negative    MEDICATIONS  (STANDING):  apixaban 5 milliGRAM(s) Oral every 12 hours  aspirin  chewable 81 milliGRAM(s) Oral daily  atorvastatin 80 milliGRAM(s) Oral at bedtime  digoxin     Tablet 0.125 milliGRAM(s) Oral daily    MEDICATIONS  (PRN):      No Known Allergies      LABS                        13.1   6.9   )-----------( 188      ( 16 May 2018 06:05 )             38.8     05-15    140  |  103  |  13  ----------------------------<  90  4.1   |  27  |  0.96    Ca    9.4      15 May 2018 02:51  Mg     2.0     05-15    TPro  7.3  /  Alb  4.1  /  TBili  0.7  /  DBili  x   /  AST  42<H>  /  ALT  21  /  AlkPhos  38<L>  05-14    PT/INR - ( 14 May 2018 18:41 )   PT: 14.0 sec;   INR: 1.26          PTT - ( 14 May 2018 18:41 )  PTT:34.1 sec    CARDIAC MARKERS ( 14 May 2018 18:41 )  x     / <0.01 ng/mL / x     / x     / x            IMAGING/EKG/ETC  EKG:  Xray:  CT:  MRI:

## 2018-05-17 ENCOUNTER — TRANSCRIPTION ENCOUNTER (OUTPATIENT)
Age: 78
End: 2018-05-17

## 2018-05-17 VITALS — HEART RATE: 124 BPM | DIASTOLIC BLOOD PRESSURE: 87 MMHG | SYSTOLIC BLOOD PRESSURE: 134 MMHG | RESPIRATION RATE: 17 BRPM

## 2018-05-17 LAB
ANION GAP SERPL CALC-SCNC: 12 MMOL/L — SIGNIFICANT CHANGE UP (ref 5–17)
BUN SERPL-MCNC: 26 MG/DL — HIGH (ref 7–23)
CALCIUM SERPL-MCNC: 8.9 MG/DL — SIGNIFICANT CHANGE UP (ref 8.4–10.5)
CHLORIDE SERPL-SCNC: 103 MMOL/L — SIGNIFICANT CHANGE UP (ref 96–108)
CO2 SERPL-SCNC: 24 MMOL/L — SIGNIFICANT CHANGE UP (ref 22–31)
CREAT SERPL-MCNC: 1.01 MG/DL — SIGNIFICANT CHANGE UP (ref 0.5–1.3)
GLUCOSE SERPL-MCNC: 101 MG/DL — HIGH (ref 70–99)
POTASSIUM SERPL-MCNC: 3.9 MMOL/L — SIGNIFICANT CHANGE UP (ref 3.5–5.3)
POTASSIUM SERPL-SCNC: 3.9 MMOL/L — SIGNIFICANT CHANGE UP (ref 3.5–5.3)
SODIUM SERPL-SCNC: 139 MMOL/L — SIGNIFICANT CHANGE UP (ref 135–145)

## 2018-05-17 RX ORDER — THIAMINE MONONITRATE (VIT B1) 100 MG
1 TABLET ORAL
Qty: 0 | Refills: 0 | COMMUNITY
Start: 2018-05-17

## 2018-05-17 RX ORDER — FOLIC ACID 0.8 MG
1 TABLET ORAL DAILY
Qty: 0 | Refills: 0 | Status: DISCONTINUED | OUTPATIENT
Start: 2018-05-17 | End: 2018-05-17

## 2018-05-17 RX ORDER — METOPROLOL TARTRATE 50 MG
50 TABLET ORAL DAILY
Qty: 0 | Refills: 0 | Status: DISCONTINUED | OUTPATIENT
Start: 2018-05-17 | End: 2018-05-17

## 2018-05-17 RX ORDER — PREGABALIN 225 MG/1
1 CAPSULE ORAL
Qty: 0 | Refills: 0 | COMMUNITY
Start: 2018-05-17

## 2018-05-17 RX ORDER — POTASSIUM CHLORIDE 20 MEQ
10 PACKET (EA) ORAL ONCE
Qty: 0 | Refills: 0 | Status: COMPLETED | OUTPATIENT
Start: 2018-05-17 | End: 2018-05-17

## 2018-05-17 RX ORDER — ASPIRIN/CALCIUM CARB/MAGNESIUM 324 MG
1 TABLET ORAL
Qty: 0 | Refills: 0 | COMMUNITY
Start: 2018-05-17

## 2018-05-17 RX ORDER — THIAMINE MONONITRATE (VIT B1) 100 MG
100 TABLET ORAL DAILY
Qty: 0 | Refills: 0 | Status: DISCONTINUED | OUTPATIENT
Start: 2018-05-17 | End: 2018-05-17

## 2018-05-17 RX ORDER — PREGABALIN 225 MG/1
1000 CAPSULE ORAL DAILY
Qty: 0 | Refills: 0 | Status: DISCONTINUED | OUTPATIENT
Start: 2018-05-17 | End: 2018-05-17

## 2018-05-17 RX ORDER — FOLIC ACID 0.8 MG
1 TABLET ORAL
Qty: 0 | Refills: 0 | COMMUNITY
Start: 2018-05-17

## 2018-05-17 RX ADMIN — Medication 0.12 MILLIGRAM(S): at 06:05

## 2018-05-17 RX ADMIN — Medication 10 MILLIEQUIVALENT(S): at 12:08

## 2018-05-17 RX ADMIN — APIXABAN 5 MILLIGRAM(S): 2.5 TABLET, FILM COATED ORAL at 06:05

## 2018-05-17 RX ADMIN — Medication 81 MILLIGRAM(S): at 12:08

## 2018-05-17 RX ADMIN — Medication 50 MILLIGRAM(S): at 14:19

## 2018-05-17 NOTE — PROGRESS NOTE ADULT - PROBLEM SELECTOR PLAN 2
-rate controlled on admission  -c/w dig 0.125   -c/w eliquis 5 BID

## 2018-05-17 NOTE — DISCHARGE NOTE ADULT - HOSPITAL COURSE
77 yr old female with a PMHx of CVA (unclear deficits), a-fib (on eliquis, dig, toprol), HTN and HLD presenting with new onset RUE weakness, dizziness, difficulty ambulating since waking up the morning of admission. She was admitted to  for stroke r/o. Work up was negative. Patient had difficulty walking PT came and saw and recommended LANCE. Patient started on vitamin repletion.  Patient has completed work up and is stable for discharge.

## 2018-05-17 NOTE — DISCHARGE NOTE ADULT - PATIENT PORTAL LINK FT
You can access the [facility] Patient Portal, offered by [facility], by registering with the following website: [email or web address]

## 2018-05-17 NOTE — DISCHARGE NOTE ADULT - CARE PROVIDER_API CALL
Reva Webb), Neurology; Vascular Neurology  [address] [place] [address]  Phone: [phone]  Fax: [phone]

## 2018-05-17 NOTE — DISCHARGE NOTE ADULT - CARE PROVIDERS DIRECT ADDRESSES
[email or web address]

## 2018-05-17 NOTE — PROGRESS NOTE ADULT - PROBLEM SELECTOR PLAN 3
-came in with elevated BP with  on admission   -recommend keeping SBP < 220 to help with perfusion for 48 hours

## 2018-05-17 NOTE — PROGRESS NOTE ADULT - PROBLEM SELECTOR PLAN 1
-patient new onset RUE weakness and dizziness; last known normal was last night before bed  -CTH (-) for acute stroke but s/f chronic L frontal lobe wedge shape infarct  -CTA (-)  -MR Head pending  -MIRNA no shunt; could not visualize the LEAH  -PT/OT consult  -ASA 81  -lipitor 90

## 2018-05-17 NOTE — PROGRESS NOTE ADULT - SUBJECTIVE AND OBJECTIVE BOX
O/N Events:  Subjective/ROS: Denies HA, CP, SOB, n/v, changes in bowel/urinary habits.  12pt ROS otherwise negative.    VITALS  Vital Signs Last 24 Hrs  T(C): 36.6 (17 May 2018 05:50), Max: 37 (16 May 2018 18:00)  T(F): 97.9 (17 May 2018 05:50), Max: 98.6 (16 May 2018 18:00)  HR: 70 (17 May 2018 05:08) (70 - 96)  BP: 132/91 (17 May 2018 05:08) (117/84 - 163/97)  BP(mean): 108 (17 May 2018 05:08) (108 - 124)  RR: 18 (17 May 2018 05:08) (17 - 18)  SpO2: 96% (17 May 2018 05:08) (88% - 97%)    CAPILLARY BLOOD GLUCOSE          PHYSICAL EXAM  General: A&Ox3; NAD  Head: NC/AT; MMM; PERRL; EOMI;  Neck: Supple; no JVD  Respiratory: CTA B/L; no wheezes/crackles   Cardiovascular: Regular rhythm/rate; S1/S2   Gastrointestinal: Soft; NTND; normoactive BS  Extremities: WWP; no edema/cyanosis  Neurological:  CNII-XII grossly intact; no obvious focal deficits    MEDICATIONS  (STANDING):  apixaban 5 milliGRAM(s) Oral every 12 hours  aspirin  chewable 81 milliGRAM(s) Oral daily  atorvastatin 80 milliGRAM(s) Oral at bedtime  digoxin     Tablet 0.125 milliGRAM(s) Oral daily  potassium chloride    Tablet ER 10 milliEquivalent(s) Oral once    MEDICATIONS  (PRN):      No Known Allergies      LABS                        13.1   6.9   )-----------( 188      ( 16 May 2018 06:05 )             38.8     05-17    139  |  103  |  26<H>  ----------------------------<  101<H>  3.9   |  24  |  1.01    Ca    8.9      17 May 2018 05:59  Mg     2.0     05-16                IMAGING/EKG/ETC  EKG:  Xray:  CT:  MRI:

## 2018-05-17 NOTE — DISCHARGE NOTE ADULT - CARE PLAN
Principal Discharge DX:	Dizziness  Goal:	to rule out life threatening condition such as stroke  Assessment and plan of treatment:	You were admitted due to dizziness and there was concern you may have had a stroke. You had CT Scan and MRI which did not reveal any acute infarct. You were started on Aspirin and Lipitor as preventative measures. Please follow up with Dr. Webb in 2 weeks time.

## 2018-05-17 NOTE — DISCHARGE NOTE ADULT - MEDICATION SUMMARY - MEDICATIONS TO TAKE
I will START or STAY ON the medications listed below when I get home from the hospital:    traMADol 50 mg oral tablet, disintegrating  -- Indication: For Pain    aspirin 81 mg oral tablet, chewable  -- 1 tab(s) by mouth once a day  -- Indication: For CVA (cerebral vascular accident)    digoxin 125 mcg (0.125 mg) oral tablet  -- 1 tab(s) by mouth once a day  -- Indication: For Afib    Eliquis 5 mg oral tablet  -- 1 tab(s) by mouth 2 times a day  -- Indication: For Afib    gabapentin 100 mg oral capsule  -- 1 cap(s) by mouth 3 times a day  -- Indication: For Pain    atorvastatin 20 mg oral tablet  -- 1 tab(s) by mouth once a day  -- Indication: For CVA (cerebral vascular accident)    lisinopril-hydroCHLOROthiazide 20 mg-25 mg oral tablet  -- 1 tab(s) by mouth once a day  -- Indication: For HTN (hypertension)    metoprolol succinate 50 mg oral tablet, extended release  -- 1 tab(s) by mouth once a day  -- Indication: For Afib I will START or STAY ON the medications listed below when I get home from the hospital:    traMADol 50 mg oral tablet, disintegrating  -- Indication: For Pain    aspirin 81 mg oral tablet, chewable  -- 1 tab(s) by mouth once a day  -- Indication: For CVA (cerebral vascular accident)    digoxin 125 mcg (0.125 mg) oral tablet  -- 1 tab(s) by mouth once a day  -- Indication: For Afib    Eliquis 5 mg oral tablet  -- 1 tab(s) by mouth 2 times a day  -- Indication: For Afib    gabapentin 100 mg oral capsule  -- 1 cap(s) by mouth 3 times a day  -- Indication: For Pain    atorvastatin 20 mg oral tablet  -- 1 tab(s) by mouth once a day  -- Indication: For CVA (cerebral vascular accident)    lisinopril-hydroCHLOROthiazide 20 mg-25 mg oral tablet  -- 1 tab(s) by mouth once a day  -- Indication: For HTN (hypertension)    metoprolol succinate 50 mg oral tablet, extended release  -- 1 tab(s) by mouth once a day  -- Indication: For Afib    cyanocobalamin 1000 mcg oral tablet  -- 1 tab(s) by mouth once a day  -- Indication: For Nutrition, metabolism, and development symptoms    folic acid 1 mg oral tablet  -- 1 tab(s) by mouth once a day  -- Indication: For Nutrition, metabolism, and development symptoms    thiamine 100 mg oral tablet  -- 1 tab(s) by mouth once a day  -- Indication: For Nutrition, metabolism, and development symptoms

## 2018-05-17 NOTE — PROGRESS NOTE ADULT - PROBLEM SELECTOR PLAN 6
SCDs    F: NS at 100cc/hr  E: Replete PRN  N: DASH  Lines: peripheral  Code; full  Dispo: 7lachman while undergoing stroke workup -> LANCE

## 2018-05-23 DIAGNOSIS — I48.91 UNSPECIFIED ATRIAL FIBRILLATION: ICD-10-CM

## 2018-05-23 DIAGNOSIS — Z86.73 PERSONAL HISTORY OF TRANSIENT ISCHEMIC ATTACK (TIA), AND CEREBRAL INFARCTION WITHOUT RESIDUAL DEFICITS: ICD-10-CM

## 2018-05-23 DIAGNOSIS — R53.1 WEAKNESS: ICD-10-CM

## 2018-05-23 DIAGNOSIS — Z79.01 LONG TERM (CURRENT) USE OF ANTICOAGULANTS: ICD-10-CM

## 2018-05-23 DIAGNOSIS — I10 ESSENTIAL (PRIMARY) HYPERTENSION: ICD-10-CM

## 2018-05-23 DIAGNOSIS — E78.5 HYPERLIPIDEMIA, UNSPECIFIED: ICD-10-CM

## 2018-05-23 DIAGNOSIS — R42 DIZZINESS AND GIDDINESS: ICD-10-CM

## 2018-05-23 PROCEDURE — 97530 THERAPEUTIC ACTIVITIES: CPT

## 2018-05-23 PROCEDURE — 80061 LIPID PANEL: CPT

## 2018-05-23 PROCEDURE — 83036 HEMOGLOBIN GLYCOSYLATED A1C: CPT

## 2018-05-23 PROCEDURE — 93005 ELECTROCARDIOGRAM TRACING: CPT

## 2018-05-23 PROCEDURE — 97116 GAIT TRAINING THERAPY: CPT

## 2018-05-23 PROCEDURE — 84439 ASSAY OF FREE THYROXINE: CPT

## 2018-05-23 PROCEDURE — 86901 BLOOD TYPING SEROLOGIC RH(D): CPT

## 2018-05-23 PROCEDURE — 86850 RBC ANTIBODY SCREEN: CPT

## 2018-05-23 PROCEDURE — 36415 COLL VENOUS BLD VENIPUNCTURE: CPT

## 2018-05-23 PROCEDURE — 97161 PT EVAL LOW COMPLEX 20 MIN: CPT

## 2018-05-23 PROCEDURE — 70450 CT HEAD/BRAIN W/O DYE: CPT

## 2018-05-23 PROCEDURE — 85730 THROMBOPLASTIN TIME PARTIAL: CPT

## 2018-05-23 PROCEDURE — 99285 EMERGENCY DEPT VISIT HI MDM: CPT | Mod: 25

## 2018-05-23 PROCEDURE — 70498 CT ANGIOGRAPHY NECK: CPT

## 2018-05-23 PROCEDURE — 84443 ASSAY THYROID STIM HORMONE: CPT

## 2018-05-23 PROCEDURE — 86900 BLOOD TYPING SEROLOGIC ABO: CPT

## 2018-05-23 PROCEDURE — 82962 GLUCOSE BLOOD TEST: CPT

## 2018-05-23 PROCEDURE — 70551 MRI BRAIN STEM W/O DYE: CPT

## 2018-05-23 PROCEDURE — 85610 PROTHROMBIN TIME: CPT

## 2018-05-23 PROCEDURE — 80053 COMPREHEN METABOLIC PANEL: CPT

## 2018-05-23 PROCEDURE — 85025 COMPLETE CBC W/AUTO DIFF WBC: CPT

## 2018-05-23 PROCEDURE — 85027 COMPLETE CBC AUTOMATED: CPT

## 2018-05-23 PROCEDURE — 93306 TTE W/DOPPLER COMPLETE: CPT

## 2018-05-23 PROCEDURE — 80048 BASIC METABOLIC PNL TOTAL CA: CPT

## 2018-05-23 PROCEDURE — 83735 ASSAY OF MAGNESIUM: CPT

## 2018-05-23 PROCEDURE — 71045 X-RAY EXAM CHEST 1 VIEW: CPT

## 2018-05-23 PROCEDURE — 70496 CT ANGIOGRAPHY HEAD: CPT

## 2018-05-23 PROCEDURE — 84484 ASSAY OF TROPONIN QUANT: CPT

## 2018-05-28 ENCOUNTER — EMERGENCY (EMERGENCY)
Facility: HOSPITAL | Age: 78
LOS: 1 days | Discharge: HOME | End: 2018-05-28
Attending: EMERGENCY MEDICINE | Admitting: EMERGENCY MEDICINE
Payer: MEDICARE

## 2018-05-28 VITALS
DIASTOLIC BLOOD PRESSURE: 117 MMHG | SYSTOLIC BLOOD PRESSURE: 176 MMHG | RESPIRATION RATE: 18 BRPM | OXYGEN SATURATION: 100 % | HEART RATE: 74 BPM | WEIGHT: 115.08 LBS | TEMPERATURE: 99 F

## 2018-05-28 DIAGNOSIS — Z79.899 OTHER LONG TERM (CURRENT) DRUG THERAPY: ICD-10-CM

## 2018-05-28 DIAGNOSIS — E78.5 HYPERLIPIDEMIA, UNSPECIFIED: ICD-10-CM

## 2018-05-28 DIAGNOSIS — Z79.1 LONG TERM (CURRENT) USE OF NON-STEROIDAL ANTI-INFLAMMATORIES (NSAID): ICD-10-CM

## 2018-05-28 DIAGNOSIS — R42 DIZZINESS AND GIDDINESS: ICD-10-CM

## 2018-05-28 DIAGNOSIS — R29.700 NIHSS SCORE 0: ICD-10-CM

## 2018-05-28 DIAGNOSIS — I10 ESSENTIAL (PRIMARY) HYPERTENSION: ICD-10-CM

## 2018-05-28 DIAGNOSIS — Z79.82 LONG TERM (CURRENT) USE OF ASPIRIN: ICD-10-CM

## 2018-05-28 LAB
ALBUMIN SERPL ELPH-MCNC: 4.1 G/DL — SIGNIFICANT CHANGE UP (ref 3.3–5)
ALP SERPL-CCNC: 48 U/L — SIGNIFICANT CHANGE UP (ref 40–120)
ALT FLD-CCNC: 20 U/L — SIGNIFICANT CHANGE UP (ref 10–45)
ANION GAP SERPL CALC-SCNC: 14 MMOL/L — SIGNIFICANT CHANGE UP (ref 5–17)
AST SERPL-CCNC: 40 U/L — SIGNIFICANT CHANGE UP (ref 10–40)
BASOPHILS NFR BLD AUTO: 1.1 % — SIGNIFICANT CHANGE UP (ref 0–2)
BILIRUB SERPL-MCNC: 0.5 MG/DL — SIGNIFICANT CHANGE UP (ref 0.2–1.2)
BUN SERPL-MCNC: 21 MG/DL — SIGNIFICANT CHANGE UP (ref 7–23)
CALCIUM SERPL-MCNC: 9.8 MG/DL — SIGNIFICANT CHANGE UP (ref 8.4–10.5)
CHLORIDE SERPL-SCNC: 95 MMOL/L — LOW (ref 96–108)
CHOLEST SERPL-MCNC: 167 MG/DL — SIGNIFICANT CHANGE UP (ref 10–199)
CK MB CFR SERPL CALC: 2.6 NG/ML — SIGNIFICANT CHANGE UP (ref 0–6.7)
CK SERPL-CCNC: 256 U/L — HIGH (ref 25–170)
CO2 SERPL-SCNC: 25 MMOL/L — SIGNIFICANT CHANGE UP (ref 22–31)
CREAT SERPL-MCNC: 0.92 MG/DL — SIGNIFICANT CHANGE UP (ref 0.5–1.3)
EOSINOPHIL NFR BLD AUTO: 2.4 % — SIGNIFICANT CHANGE UP (ref 0–6)
GLUCOSE SERPL-MCNC: 100 MG/DL — HIGH (ref 70–99)
HCT VFR BLD CALC: 41.8 % — SIGNIFICANT CHANGE UP (ref 34.5–45)
HDLC SERPL-MCNC: 66 MG/DL — SIGNIFICANT CHANGE UP (ref 40–125)
HGB BLD-MCNC: 15.1 G/DL — SIGNIFICANT CHANGE UP (ref 11.5–15.5)
LIPID PNL WITH DIRECT LDL SERPL: 84 MG/DL — SIGNIFICANT CHANGE UP
LYMPHOCYTES # BLD AUTO: 33.6 % — SIGNIFICANT CHANGE UP (ref 13–44)
MCHC RBC-ENTMCNC: 31.1 PG — SIGNIFICANT CHANGE UP (ref 27–34)
MCHC RBC-ENTMCNC: 36.1 G/DL — HIGH (ref 32–36)
MCV RBC AUTO: 86.2 FL — SIGNIFICANT CHANGE UP (ref 80–100)
MONOCYTES NFR BLD AUTO: 10.3 % — SIGNIFICANT CHANGE UP (ref 2–14)
NEUTROPHILS NFR BLD AUTO: 52.6 % — SIGNIFICANT CHANGE UP (ref 43–77)
PLATELET # BLD AUTO: 250 K/UL — SIGNIFICANT CHANGE UP (ref 150–400)
POTASSIUM SERPL-MCNC: SIGNIFICANT CHANGE UP MMOL/L (ref 3.5–5.3)
POTASSIUM SERPL-SCNC: SIGNIFICANT CHANGE UP MMOL/L (ref 3.5–5.3)
PROT SERPL-MCNC: 8 G/DL — SIGNIFICANT CHANGE UP (ref 6–8.3)
RBC # BLD: 4.85 M/UL — SIGNIFICANT CHANGE UP (ref 3.8–5.2)
RBC # FLD: 13.7 % — SIGNIFICANT CHANGE UP (ref 10.3–16.9)
SODIUM SERPL-SCNC: 134 MMOL/L — LOW (ref 135–145)
TOTAL CHOLESTEROL/HDL RATIO MEASUREMENT: 2.5 RATIO — LOW (ref 3.3–7.1)
TRIGL SERPL-MCNC: 87 MG/DL — SIGNIFICANT CHANGE UP (ref 10–149)
TROPONIN T SERPL-MCNC: <0.01 NG/ML — SIGNIFICANT CHANGE UP (ref 0–0.01)
WBC # BLD: 5.5 K/UL — SIGNIFICANT CHANGE UP (ref 3.8–10.5)
WBC # FLD AUTO: 5.5 K/UL — SIGNIFICANT CHANGE UP (ref 3.8–10.5)

## 2018-05-28 PROCEDURE — 93010 ELECTROCARDIOGRAM REPORT: CPT

## 2018-05-28 PROCEDURE — 99291 CRITICAL CARE FIRST HOUR: CPT

## 2018-05-28 PROCEDURE — 71045 X-RAY EXAM CHEST 1 VIEW: CPT | Mod: 26

## 2018-05-28 PROCEDURE — 72125 CT NECK SPINE W/O DYE: CPT | Mod: 26

## 2018-05-28 RX ORDER — HYDRALAZINE HCL 50 MG
10 TABLET ORAL ONCE
Qty: 0 | Refills: 0 | Status: COMPLETED | OUTPATIENT
Start: 2018-05-28 | End: 2018-05-28

## 2018-05-28 RX ORDER — HYDRALAZINE HCL 50 MG
25 TABLET ORAL ONCE
Qty: 0 | Refills: 0 | Status: COMPLETED | OUTPATIENT
Start: 2018-05-28 | End: 2018-05-28

## 2018-05-28 RX ADMIN — Medication 10 MILLIGRAM(S): at 23:44

## 2018-05-28 NOTE — ED PROVIDER NOTE - OBJECTIVE STATEMENT
76 yo F with hx of afib on elliquis with admission 2 weeks ago at [facility] for presentation of RUE weakness that resolved after presentation to ED-  had negative workup now back with onset of headache neck pain and numbness to bilateral hands- ? dizziness- no motor weakness per EMS on arrival -

## 2018-05-28 NOTE — ED ADULT NURSE NOTE - CHPI ED SYMPTOMS NEG
no dizziness/no fever/no confusion/no vomiting/no change in level of consciousness/no loss of consciousness/no nausea/no blurred vision/no weakness

## 2018-05-28 NOTE — ED PROVIDER NOTE - CARE PLAN
Principal Discharge DX:	Elevated blood pressure reading Principal Discharge DX:	Elevated blood pressure reading  Secondary Diagnosis:	Hypertension

## 2018-05-28 NOTE — ED ADULT NURSE NOTE - CHIEF COMPLAINT QUOTE
pt BIBA from [facility] for sudden onset head ache dizziness HTN at 915pm pt denies CP admits to b/l upper extremity numbness/ tingling FS 91 in field

## 2018-05-28 NOTE — ED PROVIDER NOTE - TOBACCO USE
Pt states since Monday evening redness to left eye. C/o body aches, chills and cough. States left eye itchy with yellow discharge.
Never smoker

## 2018-05-28 NOTE — ED ADULT NURSE NOTE - OBJECTIVE STATEMENT
77y F, A&ox3, presents to ed for dizziness and paresthesia bilateral upper and lower extremities since 2130 with accompanying headache. No facial droop, no slurring of speech,strength and sensation equal bilateral. no cp, no sob.  As per pt, reports was here for stroke work up last week. PT reports having htn in rehab  center and was given bp medications. pt sent in to rule out cva. Stroke code called at 2152, brought to ct. will continue to monitor. 77y F, A&ox3, presents to ed via ems from nursing rehab center, for dizziness, mild headache, and paresthesia bilateral upper and lower extremities since 2130 with accompanying headache. No facial droop, no slurring of speech, strength and sensation equal bilateral. no cp, no sob.  As per pt, reports was here for stroke work up last week. PT reports having htn in rehab  center and was given bp medications. pt sent in to rule out cva. Stroke code called at 2152, brought to ct. will continue to monitor. 77y F, A&ox3, presents to ed via ems from nursing rehab center, for dizziness, mild headache, and paresthesia bilateral upper and lower extremities since 2130 with accompanying headache. No facial droop, no slurring of speech, strength and sensation equal bilateral. no cp, no sob.  As per pt, reports was here for stroke work up last week. PT reports having htn in rehab  center and was given bp medications. pt sent in to rule out cva. Stroke code called at 2252, brought to ct. will continue to monitor.

## 2018-05-28 NOTE — ED PROVIDER NOTE - MEDICAL DECISION MAKING DETAILS
elevated BP--  no focal deficits-stroke scale 0 - likely anxiety with BP elevation---CT head neg CT c- spine no acute findings - given hydralazine 10 mg IV--  rec dc metoprolol and switch to Coreg at [facility] elevated BP--  no focal deficits-stroke scale 0 - likely anxiety with BP elevation---CT head neg CT c- spine no acute findings - given hydralazine 10 mg IV--  rec dc metoprolol and switch to Coreg at [facility] 141/82

## 2018-05-28 NOTE — ED ADULT TRIAGE NOTE - CHIEF COMPLAINT QUOTE
pt BIBA from [facility] for sudden onset head ache dizziness HTN at 915pm pt denies CP admits to b/l upper extremity numbness/ tingling FS 91 in field pt BIBA from [facility] for sudden onset head ache dizziness HTN at 915pm pt denies CP admits to b/l upper extremity numbness/ tingling FS 91 in field

## 2018-05-29 VITALS
SYSTOLIC BLOOD PRESSURE: 141 MMHG | TEMPERATURE: 98 F | OXYGEN SATURATION: 99 % | HEART RATE: 81 BPM | RESPIRATION RATE: 18 BRPM | DIASTOLIC BLOOD PRESSURE: 92 MMHG

## 2018-05-29 PROBLEM — I48.91 UNSPECIFIED ATRIAL FIBRILLATION: Chronic | Status: ACTIVE | Noted: 2018-05-14

## 2018-05-29 PROBLEM — I10 ESSENTIAL (PRIMARY) HYPERTENSION: Chronic | Status: ACTIVE | Noted: 2018-05-14

## 2018-05-29 PROBLEM — E78.5 HYPERLIPIDEMIA, UNSPECIFIED: Chronic | Status: ACTIVE | Noted: 2018-05-14

## 2018-05-29 PROBLEM — I63.9 CEREBRAL INFARCTION, UNSPECIFIED: Chronic | Status: ACTIVE | Noted: 2018-05-14

## 2018-05-29 LAB
APTT BLD: 42.1 SEC — HIGH (ref 27.5–37.4)
INR BLD: 1.53 — HIGH (ref 0.88–1.16)
PROTHROM AB SERPL-ACNC: 17.1 SEC — HIGH (ref 9.8–12.7)

## 2018-05-29 PROCEDURE — 71045 X-RAY EXAM CHEST 1 VIEW: CPT

## 2018-05-29 PROCEDURE — 72125 CT NECK SPINE W/O DYE: CPT

## 2018-05-29 PROCEDURE — 85730 THROMBOPLASTIN TIME PARTIAL: CPT

## 2018-05-29 PROCEDURE — 82550 ASSAY OF CK (CPK): CPT

## 2018-05-29 PROCEDURE — 70450 CT HEAD/BRAIN W/O DYE: CPT

## 2018-05-29 PROCEDURE — 85610 PROTHROMBIN TIME: CPT

## 2018-05-29 PROCEDURE — 36415 COLL VENOUS BLD VENIPUNCTURE: CPT

## 2018-05-29 PROCEDURE — 93005 ELECTROCARDIOGRAM TRACING: CPT

## 2018-05-29 PROCEDURE — 80053 COMPREHEN METABOLIC PANEL: CPT

## 2018-05-29 PROCEDURE — 84484 ASSAY OF TROPONIN QUANT: CPT

## 2018-05-29 PROCEDURE — 70450 CT HEAD/BRAIN W/O DYE: CPT | Mod: 26

## 2018-05-29 PROCEDURE — 96374 THER/PROPH/DIAG INJ IV PUSH: CPT

## 2018-05-29 PROCEDURE — 82553 CREATINE MB FRACTION: CPT

## 2018-05-29 PROCEDURE — 99291 CRITICAL CARE FIRST HOUR: CPT | Mod: 25

## 2018-05-29 PROCEDURE — 80061 LIPID PANEL: CPT

## 2018-05-29 PROCEDURE — 85025 COMPLETE CBC W/AUTO DIFF WBC: CPT

## 2018-05-29 PROCEDURE — 82962 GLUCOSE BLOOD TEST: CPT

## 2018-05-29 RX ORDER — LABETALOL HCL 100 MG
10 TABLET ORAL ONCE
Qty: 0 | Refills: 0 | Status: DISCONTINUED | OUTPATIENT
Start: 2018-05-29 | End: 2018-05-29

## 2018-05-29 RX ADMIN — Medication 25 MILLIGRAM(S): at 00:01

## 2018-05-29 RX ADMIN — Medication 10 MILLIGRAM(S): at 00:01

## 2018-05-29 NOTE — ED ADULT NURSE REASSESSMENT NOTE - NS ED NURSE REASSESS COMMENT FT1
Pt d/c back to Rehab at [facility]. RN called facility and spoke with Mr. Mati Tarango who confirmed pt is from the facility and is expecting pt return.

## 2018-05-29 NOTE — CONSULT NOTE ADULT - SUBJECTIVE AND OBJECTIVE BOX
**STROKE CODE CONSULT NOTE**    Last known well time/Time of onset of symptoms: 9:30PM    HPI: 77y F, A&ox3, presents to ed via ems from nursing rehab center, for dizziness, mild headache, and paresthesia bilateral upper and lower extremities since 2130 with accompanying headache. No facial droop, no slurring of speech, strength and sensation equal bilateral. no cp, no sob.  As per pt, reports was here for stroke work up last week. PT reports having htn in rehab  center and was given bp medications. pt sent in to rule out cva. Stroke code called at 2152, brought to ct.     PAST MEDICAL & SURGICAL HISTORY:  HLD (hyperlipidemia)  Afib  CVA (cerebral vascular accident)  HTN (hypertension)    ROS:  Constitutional: No fever, weight loss or fatigue  Eyes: No eye pain, visual disturbances, or discharge  ENMT:  No difficulty hearing, tinnitus, vertigo; No sinus or throat pain  Neck: No pain or stiffness  Respiratory: No cough, wheezing, chills or hemoptysis  Cardiovascular: No chest pain, palpitations, shortness of breath, dizziness or leg swelling  Gastrointestinal: No abdominal pain. No nausea, vomiting or hematemesis; No diarrhea or constipation. Nohematochezia.  Genitourinary: No dysuria, frequency, hematuria or incontinence  Neurological: As per HPI  Skin: No itching, burning, rashes or lesions   Endocrine: No heat or cold intolerance; No hair loss  Musculoskeletal: No joint pain or swelling; No muscle, back or extremity pain  Psychiatric: No depression, anxiety, mood swings or difficulty sleeping  Heme/Lymph: No easy bruising or bleeding gums    Allergies  No Known Allergies    Vital Signs Last 24 Hrs  T(C): 36.7 (29 May 2018 00:23), Max: 37.1 (28 May 2018 22:50)  T(F): 98.1 (29 May 2018 00:23), Max: 98.8 (28 May 2018 22:50)  HR: 81 (29 May 2018 00:23) (74 - 81)  BP: 141/92 (29 May 2018 00:23) (141/92 - 183/113)  BP(mean): --  RR: 18 (29 May 2018 00:23) (16 - 18)  SpO2: 99% (29 May 2018 00:23) (99% - 100%)    PHYSICAL EXAM:  Constitutional: WDWN; NAD  Cardiovascular: RRR, no appreciable murmurs; no carotid bruits  Neurologic:  Mental status: Awake, alert and oriented x3.  Recent and remote memory intact.  Naming, repetition and comprehension intact.  Attention/concentration intact.  No dysarthria, no aphasia.  Fund of knowledge appropriate.    Cranial nerves: Fundoscopic exam demonstrated no abnormalities, pupils equally round and reactive to light, visual fields full, no nystagmus, extraocular muscles intact, V1 through V3 intact bilaterally and symmetric, face symmetric, hearing intact to finger rub, palate elevation symmetric, tongue was midline, sternocleidomastoid/shoulder shrug strength bilaterally 5/5.    Motor:  Normal bulk and tone, strength 5/5 in bilateral upper and lower extremities.   strength 5/5.  Rapid alternating movements intact and symmetric.   Sensation: Intact to light touch, proprioception, and pinprick.  No neglect.   Coordination: No dysmetria on finger-to-nose and heel-to-shin.  No clumsiness.  Reflexes: 2+ in upper and lower extremities, downgoing toes bilaterally  Gait: Narrow and steady. No ataxia.  Romberg negative      [facility] STROKE SCALE:   NIH Stroke Scale:   · NIH Stroke Scale: LOC	(0) Alert; keenly responsive  · NIH Stroke Scale: LOC Question	(0) Answers both questions correctly  · NIH Stroke Scale: LOC Command	(0) Performs both tasks correctly  · NIH Stroke Scale: Gaze	(0) Normal  · NIH Stroke Scale: Visual	(0) No visual loss  · NIH Stroke Scale: Facial	(0) Normal symmetrical movements  · NIH Stroke Scale: Arm Left	(0) No drift  · NIH Stroke Scale: Arm Right	(0) No drift  · NIH Stroke Scale: Leg Left	(0) No drift  · NIH Stroke Scale: Leg Right	(0) No drift  · NIH Stroke Scale: Ataxia	(0) Absent  · NIH Stroke Scale: Sensory	(0) Normal; no sensory loss  · NIH Stroke Scale: Language	(0) No aphasia; normal  · NIH Stroke Scale: Dysarthria	(0) Normal articulation  · NIH Stroke Scale: Extinct Inattention	(0) No abnormality  NIH Stroke Scale: Total 0    Fingerstick Blood Glucose: CAPILLARY BLOOD GLUCOSE  106 (28 May 2018 23:27)  POCT Blood Glucose.: 106 mg/dL (28 May 2018 22:52)    LABS:                15.1   5.5   )-----------( 250      ( 28 May 2018 23:07 )             41.8     05-28    134<L>  |  95<L>  |  21  ----------------------------<  100<H>  See note   |  25  |  0.92    Ca    9.8      28 May 2018 23:07    TPro  8.0  /  Alb  4.1  /  TBili  0.5  /  DBili  x   /  AST  40  /  ALT  20  /  AlkPhos  48  05-28    PT/INR - ( 28 May 2018 23:46 )   PT: 17.1 sec;   INR: 1.53     PTT - ( 28 May 2018 23:46 )  PTT:42.1 sec  CARDIAC MARKERS ( 28 May 2018 23:07 )  x     / <0.01 ng/mL / 256 U/L / x     / 2.6 ng/mL    RADIOLOGY & ADDITIONAL STUDIES:    IV-tPA (Y/N): NO Reason IV-tPA not given: No deficits on exam, NIH 0.     ASSESSMENT/PLAN:  77 yr old female with a PMHx of CVA (unclear deficits), a-fib (on eliquis, dig, toprol), HTN and HLD presenting with bilateral fingers and toes tingling associated with headache.   Plan: CT head negative for any acute deficits. Patient recently discharged from [facility] and had full negative stroke w/u including CT, CTA, MRI, TTE. **STROKE CODE CONSULT NOTE**    Last known well time/Time of onset of symptoms: 9:30PM    HPI: 77y F, A&ox3, presents to ed via ems from nursing rehab center, for dizziness, mild headache, and paresthesia bilateral upper and lower extremities since 2130 with accompanying headache. No facial droop, no slurring of speech, strength and sensation equal bilateral. no cp, no sob.  As per pt, reports was here for stroke work up last week. PT reports having htn in rehab  center and was given bp medications. pt sent in to rule out cva. Stroke code called at 2152, brought to ct.     PAST MEDICAL & SURGICAL HISTORY:  HLD (hyperlipidemia)  Afib  CVA (cerebral vascular accident)  HTN (hypertension)    ROS:  Constitutional: No fever, weight loss or fatigue  Eyes: No eye pain, visual disturbances, or discharge  ENMT:  No difficulty hearing, tinnitus, vertigo; No sinus or throat pain  Neck: No pain or stiffness  Respiratory: No cough, wheezing, chills or hemoptysis  Cardiovascular: No chest pain, palpitations, shortness of breath, dizziness or leg swelling  Gastrointestinal: No abdominal pain. No nausea, vomiting or hematemesis; No diarrhea or constipation. Nohematochezia.  Genitourinary: No dysuria, frequency, hematuria or incontinence  Neurological: As per HPI  Skin: No itching, burning, rashes or lesions   Endocrine: No heat or cold intolerance; No hair loss  Musculoskeletal: No joint pain or swelling; No muscle, back or extremity pain  Psychiatric: No depression, anxiety, mood swings or difficulty sleeping  Heme/Lymph: No easy bruising or bleeding gums    Allergies  No Known Allergies    Vital Signs Last 24 Hrs  T(C): 36.7 (29 May 2018 00:23), Max: 37.1 (28 May 2018 22:50)  T(F): 98.1 (29 May 2018 00:23), Max: 98.8 (28 May 2018 22:50)  HR: 81 (29 May 2018 00:23) (74 - 81)  BP: 141/92 (29 May 2018 00:23) (141/92 - 183/113)  BP(mean): --  RR: 18 (29 May 2018 00:23) (16 - 18)  SpO2: 99% (29 May 2018 00:23) (99% - 100%)    PHYSICAL EXAM:  Constitutional: WDWN; NAD  Cardiovascular: RRR, no appreciable murmurs; no carotid bruits  Neurologic:  Mental status: Awake, alert and oriented x3.  Recent and remote memory intact.  Naming, repetition and comprehension intact.  Attention/concentration intact.  No dysarthria, no aphasia.  Fund of knowledge appropriate.    Cranial nerves: Fundoscopic exam demonstrated no abnormalities, pupils equally round and reactive to light, visual fields full, no nystagmus, extraocular muscles intact, V1 through V3 intact bilaterally and symmetric, face symmetric, hearing intact to finger rub, palate elevation symmetric, tongue was midline, sternocleidomastoid/shoulder shrug strength bilaterally 5/5.    Motor:  Normal bulk and tone, strength 5/5 in bilateral upper and lower extremities.   strength 5/5.  Rapid alternating movements intact and symmetric.   Sensation: Intact to light touch, proprioception, and pinprick.  No neglect.   Coordination: No dysmetria on finger-to-nose and heel-to-shin.  No clumsiness.  Reflexes: 2+ in upper and lower extremities, downgoing toes bilaterally  Gait: Narrow and steady. No ataxia.  Romberg negative      [facility] STROKE SCALE:   NIH Stroke Scale:   · NIH Stroke Scale: LOC	(0) Alert; keenly responsive  · NIH Stroke Scale: LOC Question	(0) Answers both questions correctly  · NIH Stroke Scale: LOC Command	(0) Performs both tasks correctly  · NIH Stroke Scale: Gaze	(0) Normal  · NIH Stroke Scale: Visual	(0) No visual loss  · NIH Stroke Scale: Facial	(0) Normal symmetrical movements  · NIH Stroke Scale: Arm Left	(0) No drift  · NIH Stroke Scale: Arm Right	(0) No drift  · NIH Stroke Scale: Leg Left	(0) No drift  · NIH Stroke Scale: Leg Right	(0) No drift  · NIH Stroke Scale: Ataxia	(0) Absent  · NIH Stroke Scale: Sensory	(0) Normal; no sensory loss  · NIH Stroke Scale: Language	(0) No aphasia; normal  · NIH Stroke Scale: Dysarthria	(0) Normal articulation  · NIH Stroke Scale: Extinct Inattention	(0) No abnormality  NIH Stroke Scale: Total 0    Fingerstick Blood Glucose: CAPILLARY BLOOD GLUCOSE  106 (28 May 2018 23:27)  POCT Blood Glucose.: 106 mg/dL (28 May 2018 22:52)    LABS:                15.1   5.5   )-----------( 250      ( 28 May 2018 23:07 )             41.8     05-28    134<L>  |  95<L>  |  21  ----------------------------<  100<H>  See note   |  25  |  0.92    Ca    9.8      28 May 2018 23:07    TPro  8.0  /  Alb  4.1  /  TBili  0.5  /  DBili  x   /  AST  40  /  ALT  20  /  AlkPhos  48  05-28    PT/INR - ( 28 May 2018 23:46 )   PT: 17.1 sec;   INR: 1.53     PTT - ( 28 May 2018 23:46 )  PTT:42.1 sec  CARDIAC MARKERS ( 28 May 2018 23:07 )  x     / <0.01 ng/mL / 256 U/L / x     / 2.6 ng/mL    RADIOLOGY & ADDITIONAL STUDIES:    IV-tPA (Y/N): NO Reason IV-tPA not given: No deficits on exam, NIH 0.     ASSESSMENT/PLAN:  77 yr old female with a PMHx of CVA (unclear deficits), a-fib (on eliquis, dig, toprol), HTN and HLD presenting with bilateral fingers and toes tingling associated with headache.   Plan: CT head negative for any acute deficits. Patient recently discharged from [facility] and had full negative stroke w/u including CT, CTA, MRI, TTE.   CT cervical spine with severe degenerative disease.   Most likely due to high BP, as SBPs in 180  Would recommend better BP control as per primary medical team    Discussed plan with Dr. Webb

## 2018-06-28 ENCOUNTER — APPOINTMENT (OUTPATIENT)
Dept: NEUROLOGY | Facility: CLINIC | Age: 78
End: 2018-06-28
Payer: MEDICARE

## 2018-06-28 VITALS — DIASTOLIC BLOOD PRESSURE: 76 MMHG | SYSTOLIC BLOOD PRESSURE: 109 MMHG

## 2018-06-28 VITALS
WEIGHT: 135 LBS | HEART RATE: 80 BPM | OXYGEN SATURATION: 98 % | BODY MASS INDEX: 25.49 KG/M2 | HEIGHT: 61 IN | TEMPERATURE: 98.2 F

## 2018-06-28 DIAGNOSIS — H81.4 VERTIGO OF CENTRAL ORIGIN: ICD-10-CM

## 2018-06-28 DIAGNOSIS — Z78.9 OTHER SPECIFIED HEALTH STATUS: ICD-10-CM

## 2018-06-28 DIAGNOSIS — Z63.4 DISAPPEARANCE AND DEATH OF FAMILY MEMBER: ICD-10-CM

## 2018-06-28 DIAGNOSIS — Z87.891 PERSONAL HISTORY OF NICOTINE DEPENDENCE: ICD-10-CM

## 2018-06-28 PROBLEM — Z00.00 ENCOUNTER FOR PREVENTIVE HEALTH EXAMINATION: Status: ACTIVE | Noted: 2018-06-28

## 2018-06-28 PROCEDURE — 99213 OFFICE O/P EST LOW 20 MIN: CPT

## 2018-06-28 SDOH — SOCIAL STABILITY - SOCIAL INSECURITY: DISSAPEARANCE AND DEATH OF FAMILY MEMBER: Z63.4

## 2018-07-22 PROBLEM — Z63.4 WIDOW: Status: ACTIVE | Noted: 2018-06-28

## 2018-07-22 PROBLEM — Z78.9 DOES NOT USE ILLICIT DRUGS: Status: ACTIVE | Noted: 2018-06-28

## 2018-07-22 PROBLEM — Z87.891 FORMER SMOKER: Status: ACTIVE | Noted: 2018-06-28

## 2018-07-22 RX ORDER — DIGOXIN 125 UG/1
125 TABLET ORAL DAILY
Refills: 0 | Status: ACTIVE | COMMUNITY

## 2018-07-22 RX ORDER — ATORVASTATIN CALCIUM 20 MG/1
20 TABLET, FILM COATED ORAL DAILY
Refills: 0 | Status: ACTIVE | COMMUNITY

## 2018-07-22 RX ORDER — CYANOCOBALAMIN (VITAMIN B-12) 1000 MCG
1000 TABLET ORAL DAILY
Refills: 0 | Status: ACTIVE | COMMUNITY

## 2018-07-22 RX ORDER — FOLIC ACID 1 MG/1
1 TABLET ORAL DAILY
Qty: 30 | Refills: 3 | Status: ACTIVE | COMMUNITY

## 2018-07-22 RX ORDER — APIXABAN 5 MG/1
5 TABLET, FILM COATED ORAL
Refills: 0 | Status: ACTIVE | COMMUNITY

## 2018-07-22 RX ORDER — GABAPENTIN 300 MG/1
300 CAPSULE ORAL
Qty: 30 | Refills: 1 | Status: ACTIVE | COMMUNITY

## 2018-07-22 RX ORDER — TRAMADOL HYDROCHLORIDE 50 MG/1
50 TABLET, COATED ORAL EVERY 8 HOURS
Qty: 80 | Refills: 0 | Status: ACTIVE | COMMUNITY

## 2018-07-22 RX ORDER — ASPIRIN 81 MG
81 TABLET, DELAYED RELEASE (ENTERIC COATED) ORAL DAILY
Qty: 90 | Refills: 0 | Status: ACTIVE | COMMUNITY

## 2018-08-24 ENCOUNTER — APPOINTMENT (OUTPATIENT)
Dept: NEUROLOGY | Facility: CLINIC | Age: 78
End: 2018-08-24
Payer: MEDICARE

## 2018-08-24 VITALS
HEIGHT: 61 IN | HEART RATE: 74 BPM | SYSTOLIC BLOOD PRESSURE: 147 MMHG | DIASTOLIC BLOOD PRESSURE: 99 MMHG | TEMPERATURE: 98.1 F | OXYGEN SATURATION: 97 %

## 2018-08-24 DIAGNOSIS — I51.9 HEART DISEASE, UNSPECIFIED: ICD-10-CM

## 2018-08-24 DIAGNOSIS — Z86.73 PERSONAL HISTORY OF TRANSIENT ISCHEMIC ATTACK (TIA), AND CEREBRAL INFARCTION W/OUT RESIDUAL DEFICITS: ICD-10-CM

## 2018-08-24 PROCEDURE — 99214 OFFICE O/P EST MOD 30 MIN: CPT

## 2018-08-24 RX ORDER — AMLODIPINE BESYLATE 5 MG/1
5 TABLET ORAL DAILY
Qty: 30 | Refills: 5 | Status: ACTIVE | OUTPATIENT
Start: 2018-08-24

## 2018-11-26 ENCOUNTER — APPOINTMENT (OUTPATIENT)
Dept: NEUROLOGY | Facility: CLINIC | Age: 78
End: 2018-11-26

## 2018-12-03 ENCOUNTER — APPOINTMENT (OUTPATIENT)
Dept: NEUROLOGY | Facility: CLINIC | Age: 78
End: 2018-12-03

## 2019-03-05 ENCOUNTER — APPOINTMENT (OUTPATIENT)
Dept: NEUROLOGY | Facility: CLINIC | Age: 79
End: 2019-03-05
Payer: MEDICARE

## 2019-03-05 VITALS
OXYGEN SATURATION: 98 % | WEIGHT: 133 LBS | HEIGHT: 60 IN | SYSTOLIC BLOOD PRESSURE: 155 MMHG | BODY MASS INDEX: 26.11 KG/M2 | HEART RATE: 54 BPM | DIASTOLIC BLOOD PRESSURE: 123 MMHG | TEMPERATURE: 98.5 F

## 2019-03-05 DIAGNOSIS — I10 ESSENTIAL (PRIMARY) HYPERTENSION: ICD-10-CM

## 2019-03-05 DIAGNOSIS — M54.12 RADICULOPATHY, CERVICAL REGION: ICD-10-CM

## 2019-03-05 DIAGNOSIS — I63.9 CEREBRAL INFARCTION, UNSPECIFIED: ICD-10-CM

## 2019-03-05 DIAGNOSIS — R27.0 ATAXIA, UNSPECIFIED: ICD-10-CM

## 2019-03-05 PROCEDURE — 99214 OFFICE O/P EST MOD 30 MIN: CPT

## 2019-03-05 RX ORDER — DULOXETINE HYDROCHLORIDE 20 MG/1
20 CAPSULE, DELAYED RELEASE PELLETS ORAL DAILY
Qty: 30 | Refills: 3 | Status: ACTIVE | COMMUNITY
Start: 2019-03-05 | End: 1900-01-01

## 2019-03-05 RX ORDER — METOPROLOL SUCCINATE 50 MG/1
50 TABLET, EXTENDED RELEASE ORAL DAILY
Refills: 5 | Status: ACTIVE | COMMUNITY

## 2019-03-05 RX ORDER — LISINOPRIL AND HYDROCHLOROTHIAZIDE TABLETS 20; 12.5 MG/1; MG/1
20-12.5 TABLET ORAL DAILY
Refills: 0 | Status: ACTIVE | COMMUNITY

## 2019-03-08 PROBLEM — M54.12 CERVICAL RADICULOPATHY: Status: ACTIVE | Noted: 2019-03-08

## 2019-03-08 NOTE — PHYSICAL EXAM
[FreeTextEntry1] : 164/84 90\par The patient is alert and oriented x3, naming intact x3, repetition normal, follows three-step commands, and is able to participate fully in the history taking.\par Speech is normal with no evidence of dysarthria.\par Memory is intact: Immediate recall 3 out of 3, short-term 3 out of 3, remote memory intact\par Cranial nerves II through XII intact\par Motor exam: Upper and lower extremities 5 out of 5 power, normal tone. No abnormal movements noted.\par Sensory exam: Intact to light touch and pinprick. Romberg negative.\par Coordination and vestibular exam: Finger to nose intact, no evidence of truncal or appendicular ataxia. No evidence of nystagmus. no vestibular symptoms elicited with head turning during ambulation.\par Gait: Normal stance and gait.\par Reflexes:dropped DTR on the right biceps. Increased DTRs in LE. \par no pain ROM of neck or arms

## 2019-03-08 NOTE — REVIEW OF SYSTEMS
[As Noted in HPI] : as noted in HPI [Ataxia] : ataxia [Negative] : Heme/Lymph [de-identified] : shoulder and trap pain

## 2019-03-08 NOTE — HISTORY OF PRESENT ILLNESS
[FreeTextEntry1] : hospitalized for chest and upperr back pain. no coronary procedure or myocardial damage\par main complaint is pain in bilateral shoulders  and traps. Mild unsteady gait persists.

## 2019-11-05 PROBLEM — H81.4 VERTIGO OF CENTRAL ORIGIN, UNSPECIFIED LATERALITY: Status: ACTIVE | Noted: 2018-07-22

## 2020-03-09 NOTE — ED ADULT NURSE NOTE - LANGUAGE ASSISTANCE NEEDED
Called the patient and spoke with his wife.  Instructed her that Dr. Bai said that it would be okay to fly.  Just use standard precautions and wash his hands.  Do not touch his face or mouth.   Yes-Patient/Caregiver accepts free interpretation services.../upgraded client because of MD notes - proceding with assessment and getting

## 2021-02-03 NOTE — PATIENT PROFILE ADULT. - FUNCTIONAL SCREEN CURRENT LEVEL: TRANSFERRING, MLM
C/o temps up to 102,chills, body aches, headaches, chest and back discomfort/SOB, dizziness and tingling to hands. Home pulse ox shows higher pulse and 93%   Denies known Latex allergy or symptoms of Latex sensitivity.  Medications reviewed and updated. Used Tylenol at 0745.  Writer wore face shield, mask, gown and gloves.      (0) independent

## 2022-07-14 NOTE — PROGRESS NOTE ADULT - ASSESSMENT
77 yr old female with a PMHx of CVA (unclear deficits), a-fib (on eliquis, dig, toprol), HTN and HLD presenting with new onset RUE weakness, dizziness, difficulty ambulating since waking up this morning admitted to  for stroke r/o. Patient has completed work up and is stable for discharge.    PLAN PENDING
77 yr old female with a PMHx of CVA (unclear deficits), a-fib (on eliquis, dig, toprol), HTN and HLD presenting with new onset RUE weakness, dizziness, difficulty ambulating since waking up this morning admitted to  for stroke r/o. Patient has completed work up and is stable for discharge.    PLAN PENDING
77 yr old female with a PMHx of CVA (unclear deficits), a-fib (on eliquis, dig, toprol), HTN and HLD presenting with new onset RUE weakness, dizziness, difficulty ambulating since waking up this morning admitted to  for stroke r/o. Patient has completed work up and is stable for discharge.    PLAN PENDING    Problem/Plan - 1:  ·  Problem: Cerebrovascular accident (CVA), unspecified mechanism.  Plan: -patient new onset RUE weakness and dizziness; last known normal was last night before bed  -CTH (-) for acute stroke but s/f chronic L frontal lobe wedge shape infarct  -CTA (-), MRI brain beg for acute infarct  -MIRNA no shunt; could not visualize the LEAH
77 yr old female with a PMHx of CVA (unclear deficits), a-fib (on eliquis, dig, toprol), HTN and HLD presenting with new onset RUE weakness, dizziness, difficulty ambulating since waking up this morning admitted to  for stroke r/o. She is awaiting MR head.
[Negative] : Genitourinary

## 2024-09-15 NOTE — ED ADULT NURSE NOTE - NS ED NURSE LEVEL OF CONSCIOUSNESS ORIENTATION
Start amoxicillin today  Cold fluids, Tylenol or Advil if pain  Recheck 5 to 7 days if still ill, sooner if worse  
Oriented - self; Oriented - place; Oriented - time

## 2025-01-16 NOTE — STROKE CODE NOTE - NIH STROKE SCALE: TOTAL
Patient presents with concerns of wound check. States she has two ostomy sites and was discharged on the 7th. States the nursing home noticed a color change in her stool in her ostomy bags and felt that the patient was going septic. Patient is afebrile. Noted redness around lower ostomy site. States she feels like her skin is very dry and she feels tired.    0

## 2025-05-15 NOTE — PATIENT PROFILE ADULT. - FUNCTIONAL SCREEN CURRENT LEVEL: SWALLOWING (IF SCORE 2 OR MORE FOR ANY ITEM, CONSULT REHAB SERVICES), MLM)
Caller: Juan Wyatt    Relationship: Self    Best call back number: [phone]    What is the best time to reach you: ANY    Who are you requesting to speak with (clinical staff, provider,  specific staff member):     CLINICAL    What was the call regarding:     PATIENT IS SUPPOSED TO HAVE SOME TYPE OF PROCEDURE AT [facility] LOCATION.  HE RECENTLY HAD COLONOSCOPY.  WHEN IS THIS PROCEDURE?  HE DOES NOT WANT TO GO TO [facility]      (0) swallows foods/liquids without difficulty